# Patient Record
Sex: FEMALE | Race: WHITE | NOT HISPANIC OR LATINO | Employment: OTHER | URBAN - METROPOLITAN AREA
[De-identification: names, ages, dates, MRNs, and addresses within clinical notes are randomized per-mention and may not be internally consistent; named-entity substitution may affect disease eponyms.]

---

## 2017-05-08 ENCOUNTER — TELEPHONE (OUTPATIENT)
Dept: FAMILY MEDICINE | Facility: CLINIC | Age: 67
End: 2017-05-08

## 2017-05-08 DIAGNOSIS — E03.9 ACQUIRED HYPOTHYROIDISM: ICD-10-CM

## 2017-05-08 RX ORDER — LEVOTHYROXINE SODIUM 112 UG/1
112 TABLET ORAL DAILY
Qty: 30 TABLET | Refills: 0 | COMMUNITY
Start: 2017-05-08 | End: 2017-06-13

## 2017-05-08 NOTE — TELEPHONE ENCOUNTER
Spoke with patient, she will call back to make a med refill appointment.   Informed meds were sent to pharmacy and that appt needs to be made within the next 30 days.

## 2017-05-08 NOTE — TELEPHONE ENCOUNTER
Ok refill of Synth[facility]. Pt needs non fasting OV for refills.    Thanks,Blanka  [phone] (home)

## 2017-05-22 ENCOUNTER — OFFICE VISIT (OUTPATIENT)
Dept: FAMILY MEDICINE | Facility: CLINIC | Age: 67
End: 2017-05-22

## 2017-05-22 VITALS
WEIGHT: 178 LBS | TEMPERATURE: 98.2 F | RESPIRATION RATE: 16 BRPM | SYSTOLIC BLOOD PRESSURE: 110 MMHG | OXYGEN SATURATION: 98 % | DIASTOLIC BLOOD PRESSURE: 70 MMHG | HEART RATE: 76 BPM | HEIGHT: 65 IN | BODY MASS INDEX: 29.66 KG/M2

## 2017-05-22 DIAGNOSIS — Z12.31 ENCOUNTER FOR SCREENING MAMMOGRAM FOR BREAST CANCER: Primary | ICD-10-CM

## 2017-05-22 DIAGNOSIS — R05.9 COUGH: ICD-10-CM

## 2017-05-22 PROCEDURE — 99213 OFFICE O/P EST LOW 20 MIN: CPT | Performed by: PHYSICIAN ASSISTANT

## 2017-05-22 NOTE — PROGRESS NOTES
SUBJECTIVE:                                                    Kelly Lara is a 67 year old female who presents to clinic today for the following health issues:    Kelly  is here today because of: Cough    Onset of symptoms was 3 days ago.    The patient has had symptoms of:  Cough      Patient denies ear pain, rhinorrhea, sore throat, headache, nasal congestion.   She does have occ. Eye watering and burning.     Course of illness is same.    Patient denies exposure to illness at home or work/school.     Treatment measures tried include:   Cough drops    Patient is not a smoker    Patient does not have a history of asthma        ROS:  CV: NEGATIVE for chest pain or palpitations   GI: NEGATIVE for nausea, vomiting, abdominal pain, diarrhea or constipation  : negative for dysuria, hematuria    Pt is due for colonoscopy      BP Readings from Last 3 Encounters:   05/22/17 110/70   05/06/16 120/80   02/03/16 120/80    Wt Readings from Last 3 Encounters:   05/22/17 80.7 kg (178 lb)   05/06/16 82.1 kg (181 lb)   02/03/16 82.1 kg (181 lb)                  Patient Active Problem List   Diagnosis     Hypothyroidism     Family history of malignant neoplasm of breast     Hx of colonic polyps     ACP (advance care planning)     Health Care Home     Obesity     Acquired hypothyroidism     Past Surgical History:   Procedure Laterality Date     C LIGATE FALLOPIAN TUBE,POSTPARTUM  1973     HC COLONOSCOPY THRU STOMA W BIOPSY/CAUTERY TUMOR/POLYP/LESION  11/2008    recheck in 5 years     HC REMOVE TONSILS/ADENOIDS,<11 Y/O      age 4     LAPAROSCOPIC APPENDECTOMY  8/14/2012    Procedure: LAPAROSCOPIC APPENDECTOMY;  LAPAROSCOPIC APPENDECTOMY ;  Surgeon: Jane Mak DO;  Location:  OR       Social History   Substance Use Topics     Smoking status: Never Smoker     Smokeless tobacco: Never Used     Alcohol use 3.5 oz/week     7 drink(s) per week     Family History   Problem Relation Age of Onset     CANCER Mother       "breast age 70's     Thyroid Disease Mother      hypothyroid     Thyroid Disease Daughter      Grave's with a pregnancy         Current Outpatient Prescriptions   Medication Sig Dispense Refill     levothyroxine (SYNTHROID) 112 MCG tablet Take 1 tablet (112 mcg) by mouth daily 30 tablet 0     Allergies   Allergen Reactions     No Known Allergies        OBJECTIVE:                                                    /70 (BP Location: Right arm, Cuff Size: Adult Large)  Pulse 76  Temp 98.2  F (36.8  C) (Oral)  Resp 16  Ht 1.657 m (5' 5.25\")  Wt 80.7 kg (178 lb)  LMP 01/01/2000  SpO2 98%  BMI 29.39 kg/m2   Body mass index is 29.39 kg/(m^2).     GENERAL: healthy, alert and no distress  EYES:Lids and Conjunctival normal, no discharge present bilaterally  EARS:   Right: CANAL and TM is normal: no effusions, no erythema, and normal landmarks  Left: CANAL and TM is normal: no effusions, no erythema, and normal landmarks  NOSE: normal  OROPHARYNX: normal, no tonsillar hypertrophy and no exudates present  NECK: normal, supple and no adenopathy  HEART: regular rate and rhythm; no murmurs, clicks, or gallops  LUNGS: CTA bilaterally  SKIN:Warm, Dry and No Rash           ASSESSMENT/PLAN:                                                          P:       ICD-10-CM    1. Encounter for screening mammogram for breast cancer Z12.31 Mammo Screening digital (bilat)     RADIOLOGY REFERRAL   2. Cough R05          Symptomatic cares advised including Increase fluids, rest, acetominophen or ibuprofen prn if not contraindicated.     If applicable advised the following:  Cough: sugar free cough drops, honey, Delsym bid.        The patient is to RTC prn if these symptoms worsen or fail to improve as anticipated.       Jadyn Macdonald PA-C  5/22/2017          "

## 2017-05-22 NOTE — MR AVS SNAPSHOT
After Visit Summary   5/22/2017    Kelly Lara    MRN: [id number]           Patient Information     Date Of Birth          1950        Visit Information        Provider Department      5/22/2017 10:45 AM Jadyn Macdonald PA [facility]        Today's Diagnoses     Encounter for screening mammogram for breast cancer    -  1    Cough           Follow-ups after your visit        Additional Services     RADIOLOGY REFERRAL       Your provider has referred you to: [facility]: [facility] [place] [phone]   [email or web address]    Please be aware that coverage of these services is subject to the terms and limitations of your health insurance plan.  Call member services at your health plan with any benefit or coverage questions.      Please bring the following to your appointment:    >>   Any x-rays, CTs or MRIs which have been performed.  Contact the facility where they were done to arrange for  prior to your scheduled appointment.    >>   List of current medications   >>   This referral request   >>   Any documents/labs given to you for this referral    Prior authorization is required for MRI/MRA, CT, Dexa Scans and Worker's Compensation cases.                  Who to contact     If you have questions or need follow up information about today's clinic visit or your schedule please contact [facility] directly at [phone].  Normal or non-critical lab and imaging results will be communicated to you by MyChart, letter or phone within 4 business days after the clinic has received the results. If you do not hear from us within 7 days, please contact the clinic through MyChart or phone. If you have a critical or abnormal lab result, we will notify you by phone as soon as possible.  Submit refill requests through [employer] or call your pharmacy and they will forward the refill request to us. Please allow 3 business days for your  "refill to be completed.          Additional Information About Your Visit        [employer] Mobile Information     [employer] lets you send messages to your doctor, view your test results, renew your prescriptions, schedule appointments and more. To sign up, go to [email or web address] . Click on \"Log in\" on the left side of the screen, which will take you to the Welcome page. Then click on \"Sign up Now\" on the right side of the page.     You will be asked to enter the access code listed below, as well as some personal information. Please follow the directions to create your username and password.     Your access code is: [email or web address]  Expires: 2017  5:40 AM     Your access code will  in 90 days. If you need help or a new code, please call your [facility] clinic or [phone].        Care EveryWhere ID     This is your Care EveryWhere ID. This could be used by other organizations to access your [facility] medical records  [id number]        Your Vitals Were     Pulse Temperature Respirations Height Last Period Pulse Oximetry    76 98.2  F (36.8  C) (Oral) 16 1.657 m (5' 5.25\") 2000 98%    BMI (Body Mass Index)                   29.39 kg/m2            Blood Pressure from Last 3 Encounters:   17 110/70   16 120/80   16 120/80    Weight from Last 3 Encounters:   17 80.7 kg (178 lb)   16 82.1 kg (181 lb)   16 82.1 kg (181 lb)              We Performed the Following     RADIOLOGY REFERRAL        Primary Care Provider Office Phone # Fax #    Josefina Vidales [phone]       [facility] [address] [address]        Thank you!     Thank you for choosing [facility]  for your care. Our goal is always to provide you with excellent care. Hearing back from our patients is one way we can continue to improve our services. Please take a few minutes to complete the written survey that you may receive in the mail " after your visit with us. Thank you!             Your Updated Medication List - Protect others around you: Learn how to safely use, store and throw away your medicines at www.disposemymeds.org.          This list is accurate as of: 5/22/17 11:59 PM.  Always use your most recent med list.                   Brand Name Dispense Instructions for use    SYNTHROID 112 MCG tablet   Generic drug:  levothyroxine     30 tablet    Take 1 tablet (112 mcg) by mouth daily

## 2017-05-22 NOTE — NURSING NOTE
Patient is here for a cough that started on Thursday.  Pre-Visit Screening :  Immunizations : up to date - will do Prevnar at next med check    Colonoscopy : is due and to be scheduled by patient for later completion  Mammogram : is due and to be scheduled by patient for later completion  Asthma Action Test/Plan : brittny  PHQ9/GAD7 :  Brittny  Pulse - regular  My Chart - declines    CLASSIFICATION OF OVERWEIGHT AND OBESITY BY BMI                         Obesity Class           BMI(kg/m2)  Underweight                                    < 18.5  Normal                                         18.5-24.9  Overweight                                     25.0-29.9  OBESITY                     I                  30.0-34.9                              II                 35.0-39.9  EXTREME OBESITY             III                >40                             Patient's  BMI Body mass index is 29.39 kg/(m^2).  http://hin.nhlbi.nih.gov/menuplanner/menu.cgi  Questioned patient about current smoking habits.  Pt. has never smoked.

## 2017-06-13 DIAGNOSIS — E03.9 ACQUIRED HYPOTHYROIDISM: ICD-10-CM

## 2017-06-13 RX ORDER — LEVOTHYROXINE SODIUM 112 UG/1
112 TABLET ORAL DAILY
Qty: 30 TABLET | Refills: 0 | COMMUNITY
Start: 2017-06-13 | End: 2018-04-19

## 2017-06-13 NOTE — TELEPHONE ENCOUNTER
Pt called about being out of medication and needing a refill.   Pt has a return appointment on 6/20/17.   Called in a 30 day supply for Pt to [facility] in [place].     Called Pt back and informed her of the medication being called in.     DENY Pagan (AAMA)

## 2017-06-16 LAB — MAMMOGRAM: NORMAL

## 2017-06-20 ENCOUNTER — OFFICE VISIT (OUTPATIENT)
Dept: FAMILY MEDICINE | Facility: CLINIC | Age: 67
End: 2017-06-20

## 2017-06-20 VITALS
RESPIRATION RATE: 16 BRPM | DIASTOLIC BLOOD PRESSURE: 74 MMHG | OXYGEN SATURATION: 98 % | BODY MASS INDEX: 29.32 KG/M2 | TEMPERATURE: 98.2 F | WEIGHT: 176 LBS | SYSTOLIC BLOOD PRESSURE: 114 MMHG | HEART RATE: 76 BPM | HEIGHT: 65 IN

## 2017-06-20 DIAGNOSIS — E03.9 ACQUIRED HYPOTHYROIDISM: ICD-10-CM

## 2017-06-20 DIAGNOSIS — Z23 NEED FOR VACCINATION: ICD-10-CM

## 2017-06-20 DIAGNOSIS — Z13.220 SCREENING CHOLESTEROL LEVEL: ICD-10-CM

## 2017-06-20 DIAGNOSIS — Z01.419 ENCOUNTER FOR GYNECOLOGICAL EXAMINATION WITHOUT ABNORMAL FINDING: Primary | ICD-10-CM

## 2017-06-20 DIAGNOSIS — Z13.1 SCREENING FOR DIABETES MELLITUS: ICD-10-CM

## 2017-06-20 LAB
GLUCOSE SERPL-MCNC: 80 MG/DL (ref 60–99)
HEMOGLOBIN: 13.7 G/DL (ref 11.7–15.7)

## 2017-06-20 PROCEDURE — 99397 PER PM REEVAL EST PAT 65+ YR: CPT | Mod: 25 | Performed by: FAMILY MEDICINE

## 2017-06-20 PROCEDURE — 84443 ASSAY THYROID STIM HORMONE: CPT | Mod: 90 | Performed by: FAMILY MEDICINE

## 2017-06-20 PROCEDURE — 80061 LIPID PANEL: CPT | Mod: 90 | Performed by: FAMILY MEDICINE

## 2017-06-20 PROCEDURE — 85018 HEMOGLOBIN: CPT | Performed by: FAMILY MEDICINE

## 2017-06-20 PROCEDURE — 82947 ASSAY GLUCOSE BLOOD QUANT: CPT | Performed by: FAMILY MEDICINE

## 2017-06-20 PROCEDURE — 90670 PCV13 VACCINE IM: CPT | Performed by: FAMILY MEDICINE

## 2017-06-20 PROCEDURE — 36415 COLL VENOUS BLD VENIPUNCTURE: CPT | Performed by: FAMILY MEDICINE

## 2017-06-20 PROCEDURE — G0009 ADMIN PNEUMOCOCCAL VACCINE: HCPCS | Performed by: FAMILY MEDICINE

## 2017-06-20 RX ORDER — LEVOTHYROXINE SODIUM 112 MCG
112 TABLET ORAL DAILY
Qty: 90 TABLET | Refills: 3 | Status: ON HOLD | OUTPATIENT
Start: 2017-06-20 | End: 2022-06-23

## 2017-06-20 NOTE — MR AVS SNAPSHOT
After Visit Summary   6/20/2017    Kelly Lara    MRN: [id number]           Patient Information     Date Of Birth          1950        Visit Information        Provider Department      6/20/2017 11:30 AM Josefina Vidales MD [facility]        Today's Diagnoses     Encounter for gynecological examination without abnormal finding    -  1    Acquired hypothyroidism        Screening for diabetes mellitus        Screening cholesterol level        Need for vaccination          Care Instructions    Total calcium intake of 1500 mgm/day, vitamin D 400-800IU/day and a regular weight bearing exercise program for prevention of osteoporosis is recommended treatment at this time.    A low fat diet, regular aerobic exercise like walking 30 minutes daily and weight control is the treatment recommendations at this time            Follow-ups after your visit        Follow-up notes from your care team     Return in about 1 year (around 6/20/2018).      Who to contact     If you have questions or need follow up information about today's clinic visit or your schedule please contact [facility] directly at [phone].  Normal or non-critical lab and imaging results will be communicated to you by [employer], letter or phone within 4 business days after the clinic has received the results. If you do not hear from us within 7 days, please contact the clinic through [employer] or phone. If you have a critical or abnormal lab result, we will notify you by phone as soon as possible.  Submit refill requests through [employer] or call your pharmacy and they will forward the refill request to us. Please allow 3 business days for your refill to be completed.          Additional Information About Your Visit        MyChart Information     MyFrontSteps lets you send messages to your doctor, view your test results, renew your prescriptions, schedule appointments and more. To sign up, go to  "[email or web address] . Click on \"Log in\" on the left side of the screen, which will take you to the Welcome page. Then click on \"Sign up Now\" on the right side of the page.     You will be asked to enter the access code listed below, as well as some personal information. Please follow the directions to create your username and password.     Your access code is: [email or web address]  Expires: 2017  5:40 AM     Your access code will  in 90 days. If you need help or a new code, please call your [facility] clinic or [phone].        Care EveryWhere ID     This is your Care EveryWhere ID. This could be used by other organizations to access your [facility] medical records  [id number]        Your Vitals Were     Pulse Temperature Respirations Height Last Period Pulse Oximetry    76 98.2  F (36.8  C) (Oral) 16 1.657 m (5' 5.25\") 2000 98%    BMI (Body Mass Index)                   29.06 kg/m2            Blood Pressure from Last 3 Encounters:   17 114/74   17 110/70   16 120/80    Weight from Last 3 Encounters:   17 79.8 kg (176 lb)   17 80.7 kg (178 lb)   16 82.1 kg (181 lb)              We Performed the Following     CL AFF HEMOGLOBIN (BFP)     Glucose Fasting (BFP)     Lipid Profile (QUEST)     PNEUMOCOCCAL CONJ VACCINE 13 VALENT IM     TSH (QUEST)     VACCINE ADMINISTRATION, INITIAL     VENOUS COLLECTION          Today's Medication Changes          These changes are accurate as of: 17 12:54 PM.  If you have any questions, ask your nurse or doctor.               These medicines have changed or have updated prescriptions.        Dose/Directions    * SYNTHROID 112 MCG tablet   This may have changed:  Another medication with the same name was added. Make sure you understand how and when to take each.   Used for:  Acquired hypothyroidism   Generic drug:  levothyroxine   Changed by:  Josefina Vidales MD        Dose:  112 mcg   Take 1 tablet (112 mcg) by mouth daily "   Quantity:  30 tablet   Refills:  0       * SYNTHROID 112 MCG tablet   This may have changed:  You were already taking a medication with the same name, and this prescription was added. Make sure you understand how and when to take each.   Used for:  Acquired hypothyroidism   Generic drug:  levothyroxine   Changed by:  Josefina Vidales MD        Dose:  112 mcg   Take 1 tablet (112 mcg) by mouth daily   Quantity:  90 tablet   Refills:  3       * Notice:  This list has 2 medication(s) that are the same as other medications prescribed for you. Read the directions carefully, and ask your doctor or other care provider to review them with you.         Where to get your medicines      These medications were sent to [facility] #[id number] - [place], [place] - [address] [address]  [address] [address] [facility] [address]    Hours:  [facility] Phone:  [phone]     SYNTHROID 112 MCG tablet                Primary Care Provider Office Phone # Fax #    Josefina Vidales [phone]       [place] [address]  [facility] [address]        Thank you!     Thank you for choosing [facility]  for your care. Our goal is always to provide you with excellent care. Hearing back from our patients is one way we can continue to improve our services. Please take a few minutes to complete the written survey that you may receive in the mail after your visit with us. Thank you!             Your Updated Medication List - Protect others around you: Learn how to safely use, store and throw away your medicines at www.disposemymeds.org.          This list is accurate as of: 6/20/17 12:54 PM.  Always use your most recent med list.                   Brand Name Dispense Instructions for use    * SYNTHROID 112 MCG tablet   Generic drug:  levothyroxine     30 tablet    Take 1 tablet (112 mcg) by mouth daily       * SYNTHROID 112 MCG tablet   Generic drug:  levothyroxine      90 tablet    Take 1 tablet (112 mcg) by mouth daily       * Notice:  This list has 2 medication(s) that are the same as other medications prescribed for you. Read the directions carefully, and ask your doctor or other care provider to review them with you.

## 2017-06-20 NOTE — LETTER
[facility]  [facility] [address]  [place]  [address]    June 21, 2017        Kelly Lara  [address] [address] [address]              Dear Kelly Lara      LAB RESULTS:     The results of your recent Lipid profile were NORMAL.  See the next page for your number results. Your excellent HDL (good) cholesterol and ratio make your risks of heart attack and stroke in the normal range.    If you have any further questions or problems, please contact our office at [phone].          Josefina Vidales MD

## 2017-06-20 NOTE — PROGRESS NOTES
"SUBJECTIVE:  Kelly Lara is an 67 year old  postmenopausal woman   who presents for annual gyn exam. Menopause at age 48. No   bleeding, spotting, or discharge noted.     Estrogen replacement therapy: never  SKYLER exposure: no  History of abnormal Pap smear: No  Family history of uterine or ovarian cancer: No  Regular self breast exam: Yes  History of abnormal mammogram: No  Family history of breast cancer: No  History of abnormal lipids: Yes: working on diet and weight loss    Past Medical History:  No date: Hypothyroid      Family History    CANCER Mother     Comment: breast age 70's    Thyroid Disease Mother     Comment: hypothyroid    Thyroid Disease Daughter     Comment: Grave's with a pregnancy       Past Surgical History:  1973: C LIGATE FALLOPIAN TUBE,POSTPARTUM  2008: HC COLONOSCOPY THRU STOMA W BIOPSY/CAUTERY MIN*     Comment: recheck in 5 years  No date: HC REMOVE TONSILS/ADENOIDS,<13 Y/O     Comment: age 4  2012: LAPAROSCOPIC APPENDECTOMY     Comment: Procedure: LAPAROSCOPIC APPENDECTOMY;                LAPAROSCOPIC APPENDECTOMY ;  Surgeon: Jane Mak DO;  Location:  OR    Current Outpatient Prescriptions:  levothyroxine (SYNTHROID) 112 MCG tablet   No current facility-administered medications for this visit.    -- No Known Allergies        Smoking status: Never Smoker    Smokeless tobacco: Never Used    Alcohol use Yes  3.5 oz/week    7 drink(s) per week            Review Of Systems  Ears/Nose/Throat: negative  Respiratory: No shortness of breath, dyspnea on exertion, cough, or hemoptysis  Cardiovascular: negative  Gastrointestinal: colon polyps  Genitourinary: negative    OBJECTIVE:  /74 (BP Location: Left arm, Cuff Size: Adult Large)  Pulse 76  Temp 98.2  F (36.8  C) (Oral)  Resp 16  Ht 1.657 m (5' 5.25\")  Wt 79.8 kg (176 lb)  LMP 2000  SpO2 98%  BMI 29.06 kg/m2  General appearance: healthy, alert, no distress, cooperative, smiling and over " weight  Skin: Skin color, texture, turgor normal. No rashes or lesions.  Ears: negative  Nose/Sinuses: Nares normal. Septum midline. Mucosa normal. No drainage or sinus tenderness.  Oropharynx: Lips, mucosa, and tongue normal. Teeth and gums normal.  Neck: Neck supple. No adenopathy. Thyroid symmetric, normal size,, Carotids without bruits.  Lungs: negative, Percussion normal. Good diaphragmatic excursion. Lungs clear  Heart: negative, PMI normal. No lifts, heaves, or thrills. RRR. No murmurs, clicks gallops or rub  Breasts: Inspection negative. No nipple discharge or bleeding. No masses.  Abdomen: Abdomen soft, non-tender. BS normal. No masses, organomegaly  Pelvic: Deferred  BMI : Body mass index is 29.06 kg/(m^2).    ASSESSMENT:(Z01.419) Encounter for gynecological examination without abnormal finding  (primary encounter diagnosis)  Plan: CL AFF HEMOGLOBIN (BFP), VENOUS COLLECTION        Total calcium intake of 1500 mgm/day, vitamin D 400-800IU/day and a regular weight bearing exercise program for prevention of osteoporosis is recommended treatment at this time.      (E03.9) Acquired hypothyroidism  Plan: SYNTHROID 112 MCG tablet, TSH (QUEST), VENOUS         COLLECTION        Await labs    (Z13.1) Screening for diabetes mellitus  Plan: Glucose Fasting (BFP), VENOUS COLLECTION            (Z13.220) Screening cholesterol level  Plan: Glucose Fasting (BFP), Lipid Profile (QUEST),         VENOUS COLLECTION            (Z23) Need for vaccination  Plan: PNEUMOCOCCAL CONJ VACCINE 13 VALENT IM, VACCINE        ADMINISTRATION, INITIAL, VENOUS COLLECTION                Dx:  1)  Pap smear, mammogram  2)  Lipids at appropriate intervals    PE:  Reviewed health maintenance including diet, regular exercise,   estrogen replacement and periodic exams.

## 2017-06-20 NOTE — NURSING NOTE
Patient is here for a full physical exam and pap.  Pre-Visit Screening :  Immunizations : up to date    Colonoscopy : is up to date  Mammogram : is up to date  Asthma Action Plan/Test : na  PHQ9/GAD7 : na  Pulse - regular  Medication Reconciliation: complete    BP done on the right arm, with a lg sized cuff.  Pulse - regular  My Chart - declines    CLASSIFICATION OF OVERWEIGHT AND OBESITY BY BMI                         Obesity Class           BMI(kg/m2)  Underweight                                    < 18.5  Normal                                         18.5-24.9  Overweight                                     25.0-29.9  OBESITY                     I                  30.0-34.9                              II                 35.0-39.9  EXTREME OBESITY             III                >40                             Patient's  BMI Body mass index is 29.06 kg/(m^2).  http://hin.nhlbi.nih.gov/menuplanner/menu.cgi  Questioned patient about current smoking habits.  Pt. has never smoked.  ETOH screening:  Questions:  1-How often do you have a drink containing alcohol?                             2 times per week(s)  2-How many drinks containing alcohol do you have on a typical day when you are         Drinking?                              1   3- How often do you have 5 or more drinks on one occasion?                              never per never    Have you ever:  None of the patient's responses to the CAGE screening were positive / Negative CAGE score

## 2017-06-21 LAB
CHOLEST SERPL-MCNC: 220 MG/DL (ref 125–200)
CHOLEST/HDLC SERPL: 3 (CALC)
HDLC SERPL-MCNC: 74 MG/DL
LDLC SERPL CALC-MCNC: 127 MG/DL (CALC)
NONHDLC SERPL-MCNC: 146 MG/DL (CALC)
TRIGL SERPL-MCNC: 93 MG/DL
TSH SERPL-ACNC: 2.05 MIU/L (ref 0.4–4.5)

## 2017-11-28 ENCOUNTER — OFFICE VISIT (OUTPATIENT)
Dept: FAMILY MEDICINE | Facility: CLINIC | Age: 67
End: 2017-11-28

## 2017-11-28 VITALS
DIASTOLIC BLOOD PRESSURE: 80 MMHG | RESPIRATION RATE: 12 BRPM | WEIGHT: 168.6 LBS | HEART RATE: 68 BPM | SYSTOLIC BLOOD PRESSURE: 122 MMHG | OXYGEN SATURATION: 97 % | TEMPERATURE: 97.5 F | BODY MASS INDEX: 27.1 KG/M2 | HEIGHT: 66 IN

## 2017-11-28 DIAGNOSIS — K29.00 OTHER ACUTE GASTRITIS WITHOUT HEMORRHAGE: Primary | ICD-10-CM

## 2017-11-28 DIAGNOSIS — M94.0 COSTOCHONDRITIS: ICD-10-CM

## 2017-11-28 LAB
% GRANULOCYTES: 72.7 %
HCT VFR BLD AUTO: 38.8 % (ref 35–47)
HELIOBACTER PYLORI ANTIBODY SCREEN: NORMAL
HEMOGLOBIN: 12.6 G/DL (ref 11.7–15.7)
LYMPHOCYTES NFR BLD AUTO: 16.1 %
MCH RBC QN AUTO: 31.5 PG (ref 26–33)
MCHC RBC AUTO-ENTMCNC: 32.5 G/DL (ref 31–36)
MCV RBC AUTO: 96.9 FL (ref 78–100)
MONOCYTES NFR BLD AUTO: 11.2 %
PLATELET COUNT - QUEST: 404 10^9/L (ref 150–375)
RBC # BLD AUTO: 4 10*12/L (ref 3.8–5.2)
WBC # BLD AUTO: 7.5 10*9/L (ref 4–11)

## 2017-11-28 PROCEDURE — 85025 COMPLETE CBC W/AUTO DIFF WBC: CPT | Performed by: FAMILY MEDICINE

## 2017-11-28 PROCEDURE — 36415 COLL VENOUS BLD VENIPUNCTURE: CPT | Performed by: FAMILY MEDICINE

## 2017-11-28 PROCEDURE — 99214 OFFICE O/P EST MOD 30 MIN: CPT | Performed by: FAMILY MEDICINE

## 2017-11-28 PROCEDURE — 86677 HELICOBACTER PYLORI ANTIBODY: CPT | Performed by: FAMILY MEDICINE

## 2017-11-28 RX ORDER — OMEPRAZOLE 10 MG/1
CAPSULE, DELAYED RELEASE ORAL
Qty: 20 CAPSULE | Refills: 0 | Status: SHIPPED | OUTPATIENT
Start: 2017-11-28 | End: 2017-12-21

## 2017-11-28 NOTE — PATIENT INSTRUCTIONS
Other acute gastritis without hemorrhage  (primary encounter diagnosis)  Comment: handout reviewed  Plan: CL AFF HEMOGRAM/PLATE/DIFF (BFP), VENOUS         COLLECTION, CL AFF HELICOBACTER PYLORI (BFP),         omeprazole (PRILOSEC) 10 MG CR capsule        Gentle diet. Potential medication side effects were discussed with the patient; let me know if any occur.. Monitor response    (M94.0) Costochondritis  Comment: handout given  Plan: CL AFF HEMOGRAM/PLATE/DIFF (BFP), VENOUS         COLLECTION, CL AFF HELICOBACTER PYLORI (BFP)        Monitor response.

## 2017-11-28 NOTE — NURSING NOTE
Kelly states on 11-8 she ate at [employer] and that night the pt had severe cramping and stated that ever since that night she has had a upset stomach, no appetite, and has been very drowsy.     Pre-Visit Screening :  Immunizations : up to date    Colonoscopy : is up to date  Mammogram : is up to date  Asthma Action Test/Plan : NA  PHQ9/GAD7 :  NA    Pulse - regular  My Chart - declines    CLASSIFICATION OF OVERWEIGHT AND OBESITY BY BMI                         Obesity Class           BMI(kg/m2)  Underweight                                    < 18.5  Normal                                         18.5-24.9  Overweight                                     25.0-29.9  OBESITY                     I                  30.0-34.9                              II                 35.0-39.9  EXTREME OBESITY             III                >40                             Patient's  BMI Body mass index is 27.63 kg/(m^2).  http://hin.nhlbi.nih.gov/menuplanner/menu.cgi  Questioned patient about current smoking habits.  Pt. has never smoked.  The patient has verbalized that it is ok to leave a detailed voice message on the patient's cell phone with results/recommendations from this visit.

## 2017-11-28 NOTE — MR AVS SNAPSHOT
After Visit Summary   11/28/2017    Kelly Lara    MRN: [id number]           Patient Information     Date Of Birth          1950        Visit Information        Provider Department      11/28/2017 2:00 PM Josefina Vidales MD [facility]        Today's Diagnoses     Other acute gastritis without hemorrhage    -  1    Costochondritis          Care Instructions    Other acute gastritis without hemorrhage  (primary encounter diagnosis)  Comment: handout reviewed  Plan: CL AFF HEMOGRAM/PLATE/DIFF (BFP), VENOUS         COLLECTION, CL AFF HELICOBACTER PYLORI (BFP),         omeprazole (PRILOSEC) 10 MG CR capsule        Gentle diet. Potential medication side effects were discussed with the patient; let me know if any occur.. Monitor response    (M94.0) Costochondritis  Comment: handout given  Plan: CL AFF HEMOGRAM/PLATE/DIFF (BFP), VENOUS         COLLECTION, CL AFF HELICOBACTER PYLORI (BFP)        Monitor response.            Follow-ups after your visit        Who to contact     If you have questions or need follow up information about today's clinic visit or your schedule please contact [facility] directly at [phone].  Normal or non-critical lab and imaging results will be communicated to you by MyChart, letter or phone within 4 business days after the clinic has received the results. If you do not hear from us within 7 days, please contact the clinic through MyChart or phone. If you have a critical or abnormal lab result, we will notify you by phone as soon as possible.  Submit refill requests through [employer] or call your pharmacy and they will forward the refill request to us. Please allow 3 business days for your refill to be completed.          Additional Information About Your Visit        Care EveryWhere ID     This is your Care EveryWhere ID. This could be used by other organizations to access your [facility] medical records  [id number]       "  Your Vitals Were     Pulse Temperature Respirations Height Last Period Pulse Oximetry    68 97.5  F (36.4  C) (Oral) 12 1.664 m (5' 5.5\") 01/01/2000 97%    Breastfeeding? BMI (Body Mass Index)                No 27.63 kg/m2           Blood Pressure from Last 3 Encounters:   11/28/17 122/80   06/20/17 114/74   05/22/17 110/70    Weight from Last 3 Encounters:   11/28/17 76.5 kg (168 lb 9.6 oz)   06/20/17 79.8 kg (176 lb)   05/22/17 80.7 kg (178 lb)              We Performed the Following     CL AFF HELICOBACTER PYLORI (BFP)     CL AFF HEMOGRAM/PLATE/DIFF (BFP)     VENOUS COLLECTION          Today's Medication Changes          These changes are accurate as of: 11/28/17  3:20 PM.  If you have any questions, ask your nurse or doctor.               Start taking these medicines.        Dose/Directions    omeprazole 10 MG CR capsule   Commonly known as:  priLOSEC   Used for:  Other acute gastritis without hemorrhage   Started by:  Josefina Vidales MD        Take by mouth 30-60 minutes before a meal.   Quantity:  20 capsule   Refills:  0            Where to get your medicines      These medications were sent to [facility]/pharmacy #[id number] - [place], [place] - [address] [facility]  [address] [address] [facility] [address]    Hours:  [facility] to [employer] Phone:  [phone]     omeprazole 10 MG CR capsule                Primary Care Provider Office Phone # Fax #    Josefina Vidales [phone]       [facility] [address] [facility]  [address]  [facility] [address]        Equal Access to Services     [facility] Heart[facility]AND[facility] AH: Hadii nurys feliciano hadasho Soomaali, waaxda luqadaha, qaybta kaalmada adeegyawanda, eugenia doyle. So [facility] [phone].    ATENCIÓN: Si habla español, tiene a hernandez disposición servicios gratuitos de asistencia lingüística. Llame al [phone].    We comply with applicable federal civil rights laws and [place] laws. We do not discriminate on the basis of race, color, national origin, age, " disability, sex, sexual orientation, or gender identity.            Thank you!     Thank you for choosing [facility]  for your care. Our goal is always to provide you with excellent care. Hearing back from our patients is one way we can continue to improve our services. Please take a few minutes to complete the written survey that you may receive in the mail after your visit with us. Thank you!             Your Updated Medication List - Protect others around you: Learn how to safely use, store and throw away your medicines at www.disposemymeds.org.          This list is accurate as of: 11/28/17  3:20 PM.  Always use your most recent med list.                   Brand Name Dispense Instructions for use Diagnosis    omeprazole 10 MG CR capsule    priLOSEC    20 capsule    Take by mouth 30-60 minutes before a meal.    Other acute gastritis without hemorrhage       * SYNTHROID 112 MCG tablet   Generic drug:  levothyroxine     30 tablet    Take 1 tablet (112 mcg) by mouth daily    Acquired hypothyroidism       * SYNTHROID 112 MCG tablet   Generic drug:  levothyroxine     90 tablet    Take 1 tablet (112 mcg) by mouth daily    Acquired hypothyroidism       * Notice:  This list has 2 medication(s) that are the same as other medications prescribed for you. Read the directions carefully, and ask your doctor or other care provider to review them with you.

## 2017-11-28 NOTE — PROGRESS NOTES
SUBJECTIVE: 67 year old female complaining of stomach upset after eating at [employer] starting a few hours and including 24 hours of diarrhea slowly resolving. She continue for 4 week(s) to be tired and have no appetite.   The patient describes some chest discomfort in the morning getting out of bed.   The patient denies a history of fever, SOB, GI or  complaints.   Smoking history: No.   Relevant past medical history: positive for has been lifting weights with exercise routine more than usual.     ROS: 10 point ROS neg other than the symptoms noted above in the HPI.  Past Surgical History:   Procedure Laterality Date     C LIGATE FALLOPIAN TUBE,POSTPARTUM  1973     HC COLONOSCOPY THRU STOMA W BIOPSY/CAUTERY TUMOR/POLYP/LESION  11/2008    recheck in 5 years     HC REMOVE TONSILS/ADENOIDS,<13 Y/O      age 4     LAPAROSCOPIC APPENDECTOMY  8/14/2012    Procedure: LAPAROSCOPIC APPENDECTOMY;  LAPAROSCOPIC APPENDECTOMY ;  Surgeon: Jane Mak DO;  Location:  OR     Current Outpatient Prescriptions   Medication     omeprazole (PRILOSEC) 10 MG CR capsule     SYNTHROID 112 MCG tablet     levothyroxine (SYNTHROID) 112 MCG tablet     No current facility-administered medications for this visit.          OBJECTIVE: The patient appears healthy, alert, no distress, cooperative and smiling.   EARS: negative  NOSE/SINUS: Nares normal. Septum midline. Mucosa normal. No drainage or sinus tenderness.   THROAT: normal   NECK:Neck supple. No adenopathy. Thyroid symmetric, normal size,, Carotids without bruits.   CHEST: Regular rate and  rhythm. S1 and S2 normal, no murmurs, clicks, gallops or rubs. No edema or JVD. Chest is clear; no wheezes or rales.  Pain along the costochondral areas bilaterally on palpation. No skin changes  ABD: The abdomen is soft without guarding, mass or organomegaly. Mild epigastric tenderness on deep palpation. Bowel sounds are normal. No CVA tenderness or inguinal adenopathy noted.    CBC: WNL  H  pylori: neg    ASSESSMENT: (K29.00) Other acute gastritis without hemorrhage  (primary encounter diagnosis)  Comment: handout reviewed  Plan: CL AFF HEMOGRAM/PLATE/DIFF (BFP), VENOUS         COLLECTION, CL AFF HELICOBACTER PYLORI (BFP),         omeprazole (PRILOSEC) 10 MG CR capsule        Gentle diet. Potential medication side effects were discussed with the patient; let me know if any occur.. Monitor response    (M94.0) Costochondritis  Comment: handout given  Plan: CL AFF HEMOGRAM/PLATE/DIFF (BFP), VENOUS         COLLECTION, CL AFF HELICOBACTER PYLORI (BFP)        Monitor response.

## 2017-12-21 ENCOUNTER — APPOINTMENT (OUTPATIENT)
Dept: CT IMAGING | Facility: CLINIC | Age: 67
End: 2017-12-21
Attending: EMERGENCY MEDICINE
Payer: MEDICARE

## 2017-12-21 ENCOUNTER — HOSPITAL ENCOUNTER (EMERGENCY)
Facility: CLINIC | Age: 67
Discharge: HOME | End: 2017-12-21
Attending: EMERGENCY MEDICINE | Admitting: EMERGENCY MEDICINE
Payer: MEDICARE

## 2017-12-21 VITALS
TEMPERATURE: 97.5 F | BODY MASS INDEX: 25.76 KG/M2 | SYSTOLIC BLOOD PRESSURE: 130 MMHG | WEIGHT: 160.3 LBS | OXYGEN SATURATION: 97 % | HEIGHT: 66 IN | RESPIRATION RATE: 16 BRPM | DIASTOLIC BLOOD PRESSURE: 86 MMHG

## 2017-12-21 DIAGNOSIS — I26.99 OTHER PULMONARY EMBOLISM WITHOUT ACUTE COR PULMONALE, UNSPECIFIED CHRONICITY (H): ICD-10-CM

## 2017-12-21 DIAGNOSIS — C78.6 PERITONEAL CARCINOMATOSIS (H): ICD-10-CM

## 2017-12-21 LAB
ALBUMIN SERPL-MCNC: 2.9 G/DL (ref 3.4–5)
ALP SERPL-CCNC: 79 U/L (ref 40–150)
ALT SERPL W P-5'-P-CCNC: 79 U/L (ref 0–50)
ANION GAP SERPL CALCULATED.3IONS-SCNC: 8 MMOL/L (ref 3–14)
APTT PPP: 30 SEC (ref 22–37)
AST SERPL W P-5'-P-CCNC: 63 U/L (ref 0–45)
BASOPHILS # BLD AUTO: 0.1 10E9/L (ref 0–0.2)
BASOPHILS NFR BLD AUTO: 0.5 %
BILIRUB SERPL-MCNC: 0.4 MG/DL (ref 0.2–1.3)
BUN SERPL-MCNC: 20 MG/DL (ref 7–30)
CALCIUM SERPL-MCNC: 8.5 MG/DL (ref 8.5–10.1)
CHLORIDE SERPL-SCNC: 100 MMOL/L (ref 94–109)
CO2 SERPL-SCNC: 27 MMOL/L (ref 20–32)
CREAT SERPL-MCNC: 0.72 MG/DL (ref 0.52–1.04)
DIFFERENTIAL METHOD BLD: NORMAL
EOSINOPHIL # BLD AUTO: 0.2 10E9/L (ref 0–0.7)
EOSINOPHIL NFR BLD AUTO: 1.6 %
ERYTHROCYTE [DISTWIDTH] IN BLOOD BY AUTOMATED COUNT: 12.6 % (ref 10–15)
GFR SERPL CREATININE-BSD FRML MDRD: 80 ML/MIN/1.7M2
GLUCOSE SERPL-MCNC: 108 MG/DL (ref 70–99)
HCT VFR BLD AUTO: 36.7 % (ref 35–47)
HGB BLD-MCNC: 12 G/DL (ref 11.7–15.7)
IMM GRANULOCYTES # BLD: 0 10E9/L (ref 0–0.4)
IMM GRANULOCYTES NFR BLD: 0.2 %
INR PPP: 1.09 (ref 0.86–1.14)
LYMPHOCYTES # BLD AUTO: 0.8 10E9/L (ref 0.8–5.3)
LYMPHOCYTES NFR BLD AUTO: 8 %
MCH RBC QN AUTO: 29.6 PG (ref 26.5–33)
MCHC RBC AUTO-ENTMCNC: 32.7 G/DL (ref 31.5–36.5)
MCV RBC AUTO: 90 FL (ref 78–100)
MONOCYTES # BLD AUTO: 0.8 10E9/L (ref 0–1.3)
MONOCYTES NFR BLD AUTO: 8 %
NEUTROPHILS # BLD AUTO: 7.7 10E9/L (ref 1.6–8.3)
NEUTROPHILS NFR BLD AUTO: 81.7 %
NRBC # BLD AUTO: 0 10*3/UL
NRBC BLD AUTO-RTO: 0 /100
NT-PROBNP SERPL-MCNC: 212 PG/ML (ref 0–900)
PLATELET # BLD AUTO: 368 10E9/L (ref 150–450)
POTASSIUM SERPL-SCNC: 3.8 MMOL/L (ref 3.4–5.3)
PROT SERPL-MCNC: 7.6 G/DL (ref 6.8–8.8)
RBC # BLD AUTO: 4.06 10E12/L (ref 3.8–5.2)
SODIUM SERPL-SCNC: 135 MMOL/L (ref 133–144)
TROPONIN I SERPL-MCNC: <0.015 UG/L (ref 0–0.04)
WBC # BLD AUTO: 9.5 10E9/L (ref 4–11)

## 2017-12-21 PROCEDURE — 85610 PROTHROMBIN TIME: CPT | Performed by: EMERGENCY MEDICINE

## 2017-12-21 PROCEDURE — 83880 ASSAY OF NATRIURETIC PEPTIDE: CPT | Performed by: EMERGENCY MEDICINE

## 2017-12-21 PROCEDURE — 85025 COMPLETE CBC W/AUTO DIFF WBC: CPT | Performed by: EMERGENCY MEDICINE

## 2017-12-21 PROCEDURE — 96372 THER/PROPH/DIAG INJ SC/IM: CPT

## 2017-12-21 PROCEDURE — 99285 EMERGENCY DEPT VISIT HI MDM: CPT | Mod: 25

## 2017-12-21 PROCEDURE — 70450 CT HEAD/BRAIN W/O DYE: CPT

## 2017-12-21 PROCEDURE — 85730 THROMBOPLASTIN TIME PARTIAL: CPT | Performed by: EMERGENCY MEDICINE

## 2017-12-21 PROCEDURE — 84484 ASSAY OF TROPONIN QUANT: CPT | Performed by: EMERGENCY MEDICINE

## 2017-12-21 PROCEDURE — 25000128 H RX IP 250 OP 636: Performed by: EMERGENCY MEDICINE

## 2017-12-21 PROCEDURE — 93005 ELECTROCARDIOGRAM TRACING: CPT

## 2017-12-21 PROCEDURE — 80053 COMPREHEN METABOLIC PANEL: CPT | Performed by: EMERGENCY MEDICINE

## 2017-12-21 RX ADMIN — ENOXAPARIN SODIUM 70 MG: 80 INJECTION SUBCUTANEOUS at 17:48

## 2017-12-21 ASSESSMENT — ENCOUNTER SYMPTOMS
FEVER: 0
VOMITING: 0
SHORTNESS OF BREATH: 0
HEADACHES: 0
COUGH: 1
NAUSEA: 0

## 2017-12-21 NOTE — DISCHARGE INSTRUCTIONS
Please make an appointment to follow up with [facility] [phone] as soon as possible even if entirely better.    Please make an appointment to follow up with your primary care provider in 4 days even if entirely better.

## 2017-12-21 NOTE — ED NOTES
Patient arrives here for evaluation of CT results that was done today. Had a chest xray in November and found fluid, was drained and found to be cancerous. She had CT today and was told to come into the ER. Patient notes mild cough but denies SOB or hemotypsis. AOX4 ABCs intact.

## 2017-12-21 NOTE — ED AVS SNAPSHOT
[facility]    [address]    [facility] [address]    Phone:  [phone]    Fax:  [phone]                                       Kelly Lara   MRN: [id number]    Department:  [facility]   Date of Visit:  12/21/2017           Patient Information     Date Of Birth          1950        Your diagnoses for this visit were:     Other pulmonary embolism without acute cor pulmonale, unspecified chronicity (H)     Peritoneal carcinomatosis (H)        You were seen by Charli Main MD.        Discharge Instructions       Please make an appointment to follow up with [facility] [phone] as soon as possible even if entirely better.    Please make an appointment to follow up with your primary care provider in 4 days even if entirely better.            Discharge References/Attachments     EMBOLISM, PULMONARY (ENGLISH)      24 Hour Appointment Hotline       To make an appointment at any [facility] clinic, call [phone] ([phone]). If you don't have a family doctor or clinic, we will help you find one. [facility] clinics are conveniently located to serve the needs of you and your family.             Review of your medicines      START taking        Dose / Directions Last dose taken    enoxaparin 80 MG/0.8ML injection   Commonly known as:  LOVENOX   Dose:  1 mg/kg   Quantity:  20.44 mL        Inject 0.73 mLs (73 mg) Subcutaneous 2 times daily for 14 days   Refills:  0          Our records show that you are taking the medicines listed below. If these are incorrect, please call your family doctor or clinic.        Dose / Directions Last dose taken    * SYNTHROID 112 MCG tablet   Dose:  112 mcg   Quantity:  30 tablet   Generic drug:  levothyroxine        Take 1 tablet (112 mcg) by mouth daily   Refills:  0        * SYNTHROID 112 MCG tablet   Dose:  112 mcg   Quantity:  90 tablet   Generic drug:   levothyroxine        Take 1 tablet (112 mcg) by mouth daily   Refills:  3        * Notice:  This list has 2 medication(s) that are the same as other medications prescribed for you. Read the directions carefully, and ask your doctor or other care provider to review them with you.            Prescriptions were sent or printed at these locations (1 Prescription)                   Other Prescriptions                Printed at Department/Unit printer (1 of 1)         enoxaparin (LOVENOX) 80 MG/0.8ML injection                Procedures and tests performed during your visit     BNP    CBC + differential    Comprehensive metabolic panel    EKG 12 lead    Head CT w/o contrast    INR    PTT    Troponin I      Orders Needing Specimen Collection     None      Pending Results     Date and Time Order Name Status Description    12/21/2017 1506 Head CT w/o contrast Preliminary             Pending Culture Results     No orders found from 12/19/2017 to 12/22/2017.            Pending Results Instructions     If you had any lab results that were not finalized at the time of your Discharge, you can call the ED Lab Result RN at [phone]. You will be contacted by this team for any positive Lab results or changes in treatment. The nurses are available 7 days a week from 10A to 6:30P.  You can leave a message 24 hours per day and they will return your call.        Test Results From Your Hospital Stay        12/21/2017  3:21 PM      Component Results     Component Value Ref Range & Units Status    WBC 9.5 4.0 - 11.0 10e9/L Final    RBC Count 4.06 3.8 - 5.2 10e12/L Final    Hemoglobin 12.0 11.7 - 15.7 g/dL Final    Hematocrit 36.7 35.0 - 47.0 % Final    MCV 90 78 - 100 fl Final    MCH 29.6 26.5 - 33.0 pg Final    MCHC 32.7 31.5 - 36.5 g/dL Final    RDW 12.6 10.0 - 15.0 % Final    Platelet Count 368 150 - 450 10e9/L Final    Diff Method Automated Method  Final    % Neutrophils 81.7 % Final    % Lymphocytes 8.0 % Final    % Monocytes 8.0 %  Final    % Eosinophils 1.6 % Final    % Basophils 0.5 % Final    % Immature Granulocytes 0.2 % Final    Nucleated RBCs 0 0 /100 Final    Absolute Neutrophil 7.7 1.6 - 8.3 10e9/L Final    Absolute Lymphocytes 0.8 0.8 - 5.3 10e9/L Final    Absolute Monocytes 0.8 0.0 - 1.3 10e9/L Final    Absolute Eosinophils 0.2 0.0 - 0.7 10e9/L Final    Absolute Basophils 0.1 0.0 - 0.2 10e9/L Final    Abs Immature Granulocytes 0.0 0 - 0.4 10e9/L Final    Absolute Nucleated RBC 0.0  Final         12/21/2017  3:44 PM      Component Results     Component Value Ref Range & Units Status    Sodium 135 133 - 144 mmol/L Final    Potassium 3.8 3.4 - 5.3 mmol/L Final    Chloride 100 94 - 109 mmol/L Final    Carbon Dioxide 27 20 - 32 mmol/L Final    Anion Gap 8 3 - 14 mmol/L Final    Glucose 108 (H) 70 - 99 mg/dL Final    Urea Nitrogen 20 7 - 30 mg/dL Final    Creatinine 0.72 0.52 - 1.04 mg/dL Final    GFR Estimate 80 >60 mL/min/1.7m2 Final    Non  GFR Calc    GFR Estimate If Black >90 >60 mL/min/1.7m2 Final    African American GFR Calc    Calcium 8.5 8.5 - 10.1 mg/dL Final    Bilirubin Total 0.4 0.2 - 1.3 mg/dL Final    Albumin 2.9 (L) 3.4 - 5.0 g/dL Final    Protein Total 7.6 6.8 - 8.8 g/dL Final    Alkaline Phosphatase 79 40 - 150 U/L Final    ALT 79 (H) 0 - 50 U/L Final    AST 63 (H) 0 - 45 U/L Final         12/21/2017  3:31 PM      Component Results     Component Value Ref Range & Units Status    INR 1.09 0.86 - 1.14 Final         12/21/2017  3:31 PM      Component Results     Component Value Ref Range & Units Status    PTT 30 22 - 37 sec Final         12/21/2017  3:41 PM      Component Results     Component Value Ref Range & Units Status    N-Terminal Pro BNP Inpatient 212 0 - 900 pg/mL Final       Reference range shown and results flagged as abnormal are suggested inpatient   cut points for confirming diagnosis if CHF in an acute setting. Establishing a   baseline value for each individual patient is useful for follow-up.  An   inpatient or emergency department NT-proPBNP <300 pg/mL effectively rules out   acute CHF, with 99% negative predictive value.  The outpatient non-acute reference range for ruling out CHF is:   0-125 pg/mL (age 18 to less than 75)   0-450 pg/mL (age 75 yrs and older)           12/21/2017  3:41 PM      Component Results     Component Value Ref Range & Units Status    Troponin I ES <0.015 0.000 - 0.045 ug/L Final    The 99th percentile for upper reference range is 0.045 ug/L.  Troponin values   in the range of 0.045 - 0.120 ug/L may be associated with risks of adverse   clinical events.           12/21/2017  4:37 PM      Narrative     CT HEAD WITHOUT CONTRAST  12/21/2017 4:27 PM    HISTORY: Starting Lovenox for PE. Rule out intracranial metastases.    TECHNIQUE: Scans were obtained through the head without IV contrast.   Radiation dose for this scan was reduced using automated exposure  control, adjustment of the mA and/or kV according to patient size, or  iterative reconstruction technique.    COMPARISON: None.    FINDINGS: No hemorrhage, mass lesion, or focal area of acute  infarction identified. Paranasal sinuses are normal. No bony  abnormality.        Impression     IMPRESSION: Negative CT scan of the head.                Clinical Quality Measure: Blood Pressure Screening     Your blood pressure was checked while you were in the emergency department today. The last reading we obtained was  BP: 130/86 . Please read the guidelines below about what these numbers mean and what you should do about them.  If your systolic blood pressure (the top number) is less than 120 and your diastolic blood pressure (the bottom number) is less than 80, then your blood pressure is normal. There is nothing more that you need to do about it.  If your systolic blood pressure (the top number) is 120-139 or your diastolic blood pressure (the bottom number) is 80-89, your blood pressure may be higher than it should be. You should  have your blood pressure rechecked within a year by a primary care provider.  If your systolic blood pressure (the top number) is 140 or greater or your diastolic blood pressure (the bottom number) is 90 or greater, you may have high blood pressure. High blood pressure is treatable, but if left untreated over time it can put you at risk for heart attack, stroke, or kidney failure. You should have your blood pressure rechecked by a primary care provider within the next 4 weeks.  If your provider in the emergency department today gave you specific instructions to follow-up with your doctor or provider even sooner than that, you should follow that instruction and not wait for up to 4 weeks for your follow-up visit.        Thank you for choosing [facility]       Thank you for choosing [facility] for your care. Our goal is always to provide you with excellent care. Hearing back from our patients is one way we can continue to improve our services. Please take a few minutes to complete the written survey that you may receive in the mail after you visit with us. Thank you!        Care EveryWhere ID     This is your Care EveryWhere ID. This could be used by other organizations to access your [facility] medical records  [id number]        Equal Access to Services     FANY GALAVIZ : Hadtali Khan, monica vale, eugenia pool. So [facility] [phone].    ATENCIÓN: Si habla español, tiene a hernandez disposición servicios gratuitos de asistencia lingüística. Felipe al [phone].    We comply with applicable federal civil rights laws and [place] laws. We do not discriminate on the basis of race, color, national origin, age, disability, sex, sexual orientation, or gender identity.            After Visit Summary       This is your record. Keep this with you and show to your community pharmacist(s) and doctor(s) at your next visit.

## 2017-12-21 NOTE — ED AVS SNAPSHOT
[facility]    [address]    [facility] [address]    Phone:  [phone]    Fax:  [phone]                                       Kelly Lara   MRN: [id number]    Department:  [facility]   Date of Visit:  12/21/2017           After Visit Summary Signature Page     I have received my discharge instructions, and my questions have been answered. I have discussed any challenges I see with this plan with the nurse or doctor.    ..........................................................................................................................................  Patient/Patient Representative Signature      ..........................................................................................................................................  Patient Representative Print Name and Relationship to Patient    ..................................................               ................................................  Date                                            Time    ..........................................................................................................................................  Reviewed by Signature/Title    ...................................................              ..............................................  Date                                                            Time

## 2017-12-21 NOTE — ED PROVIDER NOTES
History     Chief Complaint:  Cough    HPI   Kelly Lara is a 67 year old female who presents for evaluation of a cough. On 12/18/2017, the patient was seen in her [facility] clinic for a several week history of a cough with shortness of breath. At that time, she had a chest X-ray showing a large left pleural effusion. On 12/19, she had a US thoracentesis performed with findings concerning for malignant pleural effusion. Following this, the patient had improvement of her shortness of breath. Today, the patient had a CT scan of her chest, abdomen, and pelvis to evaluate for potential cancer. This study had findings concerning for multiple pulmonary emboli and peritoneal carcinomatosis, and she was referred to the ED for further evaluation and management. Details regarding this study are as below. Currently in the ED, the patient reports that she is actually feeling well, and she denies any recent fever, chest pain, ongoing shortness of breath, hemoptysis, nausea, vomiting, abnormal leg swelling, headache, or visual disturbance. She has no personal history of blood clots and has not traveled or had surgery recently.     CT Chest/Abdomen/Pelvis w IV Contrast 12/21/2017:  IMPRESSION:    1. Filling defects in the segmental and subsegmental pulmonary arteries in the right lower lobe are most consistent with pulmonary emboli, question right upper lobe pulmonary emboli. Consider dedicated CT chest PE exam for more accurate evaluation of the extent of pulmonary embolism.  2. Small to moderate amount of ascites throughout the abdomen and pelvis with peritoneal soft tissue nodularity and omental soft tissue nodularity, most consistent with peritoneal carcinomatosis. A definite primary site of malignancy is not identified on this exam (no definite adnexal/ovarian mass, no definite pancreatic or other GI mass).  3. Decrease in the left pleural effusion, consistent with interval thoracentesis. Left lower lobe airspace  "consolidation is favored to represent an area of atelectasis. No definite lung mass.     Allergies:  NKDA      Medications:    Synthroid     Past Medical History:    Hypothyroid     Past Surgical History:    Ligate fallopian tube, postpartum  Colonoscopy   Tonsillectomy and adenoidectomy   Laparoscopic appendectomy     Family History:    Cancer, breast - Mother  Hypothyroid - Mother  Grave's disease - Daughter     Social History:  Tobacco use:    Never smoker  Alcohol use:    Positive - 7 drinks / week  Marital status:       Accompanied to ED by:  Alone      Review of Systems   Constitutional: Negative for fever.   Eyes: Negative for visual disturbance.   Respiratory: Positive for cough. Negative for shortness of breath.         (-) Hemoptysis    Cardiovascular: Negative for chest pain and leg swelling.   Gastrointestinal: Negative for nausea and vomiting.   Neurological: Negative for headaches.   All other systems reviewed and are negative.    Physical Exam   First Vitals:  BP: (!) 161/95  Heart Rate: 88  Temp: 97.5  F (36.4  C)  Resp: 16  Height: 167.6 cm (5' 6\")  Weight: 72.7 kg (160 lb 4.8 oz)  SpO2: 96 %      Physical Exam    General:   Pleasant, age appropriate well appearing female.  HEENT:    Oropharynx is moist, without lesions or trismus.  Eyes:    Conjunctiva normal  Neck:    Supple, no meningismus.     CV:     Regular rate and rhythm.      No murmurs, rubs or gallops.       No JVD or unilateral leg swelling.       2+ radial pulses bilateral.       No  lower extremity edema.  PULM:    Clear to auscultation bilateral.       No respiratory distress.      Good air exchange.     No rales or wheezing.     No stridor.  ABD:    Soft, non-tender, non-distended.       No pulsatile masses.       No rebound, guarding or rigidity.  MSK:     No gross deformity to all four extremities.   LYMPH:   No cervical lymphadenopathy.  NEURO:   Alert. Good muscle tone, no atrophy.  Skin:    Warm, dry and intact.    Psych: "    Mood is good and affect is appropriate.        Emergency Department Course   ECG (15:26:01):  Indication: Screening for cardiovascular disease.   Rate 77 bpm. CO interval 128 ms. QRS duration 92 ms. QT/QTc 386/436 ms. P-R-T axes 15 21 7.   Interpretation: Normal sinus rhythm, Normal ECG  Agree with computer interpretation. Yes  No significant change compared to EKG dated 2/3/16.   Interpreted at 1531 by Dr. Main.      Imaging:  Radiographic findings were communicated with the patient who voiced understanding of the findings.    Head CT w/o Contrast:  IMPRESSION: Negative CT scan of the head.  Preliminary report per radiology.     Laboratory:  CBC: WNL (WBC 9.5, HGB 12.0, )    CMP: Glucose 108 high, Albumin 2.9 low, ALT 79 high, AST 63 high, o/w WNL (Creatinine 0.72)  INR: 1.09  PTT: 30  BNP: 212  Troponin I 1504: <0.015     Interventions:  1748 Lovenox 70 mg Subcutaneous     Emergency Department Course:  Nursing notes and vitals reviewed.  1451: I performed an exam of the patient as documented above.     1502: I discussed the patient with Dr. Shulka of radiology who reviewed the CT scan and agreed with diagnosis of PE.     1647: I updated and reassessed the patient.     I personally reviewed the laboratory results with the patient and answered all related questions prior to discharge.     Findings and plan explained to the Patient. Patient discharged home with instructions regarding supportive care, medications, and reasons to return. The importance of close follow-up was reviewed. The patient was prescribed Lovenox.      Impression & Plan      Medical Decision Making:  Kelly Lara is a 67 year old female with recent diagnosis of malignant pleural effusion and outpatient CT scan revealing peritoneal carcinomatosis and incidental finding of right lower lobe pulmonary embolism presenting to the ED for evaluation of PE. I did confirm the presence of pulmonary embolus with discussion with the  radiologist on call, who directly reviewed the images. I do not feel that a dedicated CT scan is necessary given the definitive diagnosis of pulmonary embolism. She has no signs of massive or submassive PE. Her PESI score is 99 putting her in the intermediate risk category. I recommended that she be admitted given her intermediate risk category, although the patient is asymptomatic. Given her asymptomatic status, the patient would like to be discharged home. I made her aware that she has put herself at some risk for worsening thrombosis, cardiac strain, syncope, cardiac arrest, and death. She understands these risks and still would like to be discharged home. She has capacity to make medical decisions. Given the presence of malignancy, Lovenox would be the most appropriate choice of anticoagulation at this time. She will be discharged to home on Lovenox BID until further discussion with oncology.     She was also made aware that the CT scan noted peritoneal carcinomatosis without definitive primary site of malignancy. Head CT was done to ensure there is no evidence of intracranial mass that may limit the use of anticoagulation. Head CT unremarkable. The patient is safe for discharge home with close follow up with primary care physician and referral to oncology.      Diagnosis:    ICD-10-CM   1. Other pulmonary embolism without acute cor pulmonale, unspecified chronicity (H) I26.99   2. Peritoneal carcinomatosis (H) C78.6    C80.1       Disposition:  Discharged to home with Lovenox.     Discharge Medications:  New Prescriptions    ENOXAPARIN (LOVENOX) 80 MG/0.8ML INJECTION    Inject 0.73 mLs (73 mg) Subcutaneous 2 times daily for 14 days         ILadarius, am serving as a scribe at 2:51 PM on 12/21/2017 to document services personally performed by Dr. Main, based on my observations and the provider's statements to me.    [facility] EMERGENCY DEPARTMENT       Charli Main,  MD  12/21/17 [id number]

## 2017-12-22 LAB — INTERPRETATION ECG - MUSE: NORMAL

## 2017-12-22 NOTE — ED NOTES
Pt received a Lovenox take home kit. Lovenox medication reviewed by this RN. Subcutaneous injection technic reviewed, pt demonstrated how to appropriately choose an injection site, clean it and her hands, remove the needle sheath and hold the needle to the site at a 90 degree angle. Medication administered by this RN at pt request. Pt states understanding to demonstration.

## 2018-04-02 ENCOUNTER — HOSPITAL ENCOUNTER (OUTPATIENT)
Dept: CT IMAGING | Facility: CLINIC | Age: 68
Discharge: HOME | End: 2018-04-02
Attending: INTERNAL MEDICINE | Admitting: INTERNAL MEDICINE
Payer: MEDICARE

## 2018-04-02 DIAGNOSIS — C48.2 PERITONEAL CARCINOMA (H): ICD-10-CM

## 2018-04-02 LAB
CREAT BLD-MCNC: 0.7 MG/DL (ref 0.52–1.04)
GFR SERPL CREATININE-BSD FRML MDRD: 83 ML/MIN/1.7M2

## 2018-04-02 PROCEDURE — 82565 ASSAY OF CREATININE: CPT

## 2018-04-02 PROCEDURE — 25000128 H RX IP 250 OP 636: Performed by: RADIOLOGY

## 2018-04-02 PROCEDURE — 71260 CT THORAX DX C+: CPT

## 2018-04-02 RX ORDER — IOPAMIDOL 755 MG/ML
500 INJECTION, SOLUTION INTRAVASCULAR ONCE
Status: COMPLETED | OUTPATIENT
Start: 2018-04-02 | End: 2018-04-02

## 2018-04-02 RX ADMIN — IOPAMIDOL 79 ML: 755 INJECTION, SOLUTION INTRAVENOUS at 09:39

## 2018-04-02 RX ADMIN — SODIUM CHLORIDE 60 ML: 9 INJECTION, SOLUTION INTRAVENOUS at 09:39

## 2018-04-06 PROBLEM — I26.99 PULMONARY EMBOLISM (H): Status: ACTIVE | Noted: 2018-04-06

## 2018-04-06 PROBLEM — C80.0 CARCINOMATOSIS (H): Status: ACTIVE | Noted: 2018-04-06

## 2018-04-19 ENCOUNTER — TRANSFERRED RECORDS (OUTPATIENT)
Dept: HEALTH INFORMATION MANAGEMENT | Facility: CLINIC | Age: 68
End: 2018-04-19

## 2018-04-26 ENCOUNTER — ANESTHESIA EVENT (OUTPATIENT)
Dept: SURGERY | Facility: CLINIC | Age: 68
DRG: 737 | End: 2018-04-26
Payer: MEDICARE

## 2018-04-26 ENCOUNTER — ANESTHESIA (OUTPATIENT)
Dept: SURGERY | Facility: CLINIC | Age: 68
DRG: 737 | End: 2018-04-26
Payer: MEDICARE

## 2018-04-26 ENCOUNTER — HOSPITAL ENCOUNTER (INPATIENT)
Facility: CLINIC | Age: 68
LOS: 5 days | Discharge: HOME | DRG: 737 | End: 2018-05-01
Attending: OBSTETRICS & GYNECOLOGY | Admitting: OBSTETRICS & GYNECOLOGY
Payer: MEDICARE

## 2018-04-26 ENCOUNTER — TRANSFERRED RECORDS (OUTPATIENT)
Dept: HEALTH INFORMATION MANAGEMENT | Facility: CLINIC | Age: 68
End: 2018-04-26

## 2018-04-26 DIAGNOSIS — C80.0 CARCINOMATOSIS (H): Primary | ICD-10-CM

## 2018-04-26 LAB
ABO + RH BLD: NORMAL
ABO + RH BLD: NORMAL
BLD GP AB SCN SERPL QL: NORMAL
BLOOD BANK CMNT PATIENT-IMP: NORMAL
HGB BLD-MCNC: 10.9 G/DL (ref 11.7–15.7)
INR PPP: 1.1 (ref 0.86–1.14)
PLATELET # BLD AUTO: 223 10E9/L (ref 150–450)
SPECIMEN EXP DATE BLD: NORMAL

## 2018-04-26 PROCEDURE — 12000000 ZZH R&B MED SURG/OB

## 2018-04-26 PROCEDURE — 37000008 ZZH ANESTHESIA TECHNICAL FEE, 1ST 30 MIN: Performed by: OBSTETRICS & GYNECOLOGY

## 2018-04-26 PROCEDURE — 25000128 H RX IP 250 OP 636: Performed by: ANESTHESIOLOGY

## 2018-04-26 PROCEDURE — 25000125 ZZHC RX 250: Performed by: NURSE ANESTHETIST, CERTIFIED REGISTERED

## 2018-04-26 PROCEDURE — 0DBB0ZZ EXCISION OF ILEUM, OPEN APPROACH: ICD-10-PCS | Performed by: OBSTETRICS & GYNECOLOGY

## 2018-04-26 PROCEDURE — 71000012 ZZH RECOVERY PHASE 1 LEVEL 1 FIRST HR: Performed by: OBSTETRICS & GYNECOLOGY

## 2018-04-26 PROCEDURE — 25000566 ZZH SEVOFLURANE, EA 15 MIN: Performed by: OBSTETRICS & GYNECOLOGY

## 2018-04-26 PROCEDURE — 36000093 ZZH SURGERY LEVEL 4 1ST 30 MIN: Performed by: OBSTETRICS & GYNECOLOGY

## 2018-04-26 PROCEDURE — 0UT90ZZ RESECTION OF UTERUS, OPEN APPROACH: ICD-10-PCS | Performed by: OBSTETRICS & GYNECOLOGY

## 2018-04-26 PROCEDURE — 88305 TISSUE EXAM BY PATHOLOGIST: CPT | Performed by: OBSTETRICS & GYNECOLOGY

## 2018-04-26 PROCEDURE — 88307 TISSUE EXAM BY PATHOLOGIST: CPT | Mod: 26,76 | Performed by: OBSTETRICS & GYNECOLOGY

## 2018-04-26 PROCEDURE — 27210794 ZZH OR GENERAL SUPPLY STERILE: Performed by: OBSTETRICS & GYNECOLOGY

## 2018-04-26 PROCEDURE — 85049 AUTOMATED PLATELET COUNT: CPT | Performed by: OBSTETRICS & GYNECOLOGY

## 2018-04-26 PROCEDURE — 82565 ASSAY OF CREATININE: CPT | Performed by: OBSTETRICS & GYNECOLOGY

## 2018-04-26 PROCEDURE — 85610 PROTHROMBIN TIME: CPT | Performed by: OBSTETRICS & GYNECOLOGY

## 2018-04-26 PROCEDURE — 36415 COLL VENOUS BLD VENIPUNCTURE: CPT | Performed by: ANESTHESIOLOGY

## 2018-04-26 PROCEDURE — 25000128 H RX IP 250 OP 636: Performed by: NURSE ANESTHETIST, CERTIFIED REGISTERED

## 2018-04-26 PROCEDURE — 86901 BLOOD TYPING SEROLOGIC RH(D): CPT | Performed by: ANESTHESIOLOGY

## 2018-04-26 PROCEDURE — 0UT20ZZ RESECTION OF BILATERAL OVARIES, OPEN APPROACH: ICD-10-PCS | Performed by: OBSTETRICS & GYNECOLOGY

## 2018-04-26 PROCEDURE — 36000063 ZZH SURGERY LEVEL 4 EA 15 ADDTL MIN: Performed by: OBSTETRICS & GYNECOLOGY

## 2018-04-26 PROCEDURE — 88307 TISSUE EXAM BY PATHOLOGIST: CPT | Performed by: OBSTETRICS & GYNECOLOGY

## 2018-04-26 PROCEDURE — 0D1N0ZP BYPASS SIGMOID COLON TO RECTUM, OPEN APPROACH: ICD-10-PCS | Performed by: OBSTETRICS & GYNECOLOGY

## 2018-04-26 PROCEDURE — 86900 BLOOD TYPING SEROLOGIC ABO: CPT | Performed by: ANESTHESIOLOGY

## 2018-04-26 PROCEDURE — 0DBL0ZZ EXCISION OF TRANSVERSE COLON, OPEN APPROACH: ICD-10-PCS | Performed by: OBSTETRICS & GYNECOLOGY

## 2018-04-26 PROCEDURE — 0D1L0ZN BYPASS TRANSVERSE COLON TO SIGMOID COLON, OPEN APPROACH: ICD-10-PCS | Performed by: OBSTETRICS & GYNECOLOGY

## 2018-04-26 PROCEDURE — 25800025 ZZH RX 258: Performed by: NURSE PRACTITIONER

## 2018-04-26 PROCEDURE — 85018 HEMOGLOBIN: CPT | Performed by: OBSTETRICS & GYNECOLOGY

## 2018-04-26 PROCEDURE — 25000128 H RX IP 250 OP 636: Performed by: NURSE PRACTITIONER

## 2018-04-26 PROCEDURE — 40000170 ZZH STATISTIC PRE-PROCEDURE ASSESSMENT II: Performed by: OBSTETRICS & GYNECOLOGY

## 2018-04-26 PROCEDURE — 0DBN0ZZ EXCISION OF SIGMOID COLON, OPEN APPROACH: ICD-10-PCS | Performed by: OBSTETRICS & GYNECOLOGY

## 2018-04-26 PROCEDURE — 25000128 H RX IP 250 OP 636: Performed by: OBSTETRICS & GYNECOLOGY

## 2018-04-26 PROCEDURE — 36415 COLL VENOUS BLD VENIPUNCTURE: CPT | Performed by: OBSTETRICS & GYNECOLOGY

## 2018-04-26 PROCEDURE — 0DBU0ZZ EXCISION OF OMENTUM, OPEN APPROACH: ICD-10-PCS | Performed by: OBSTETRICS & GYNECOLOGY

## 2018-04-26 PROCEDURE — 0DBP0ZZ EXCISION OF RECTUM, OPEN APPROACH: ICD-10-PCS | Performed by: OBSTETRICS & GYNECOLOGY

## 2018-04-26 PROCEDURE — 07TP0ZZ RESECTION OF SPLEEN, OPEN APPROACH: ICD-10-PCS | Performed by: OBSTETRICS & GYNECOLOGY

## 2018-04-26 PROCEDURE — 27210995 ZZH RX 272: Performed by: OBSTETRICS & GYNECOLOGY

## 2018-04-26 PROCEDURE — 88305 TISSUE EXAM BY PATHOLOGIST: CPT | Mod: 26 | Performed by: OBSTETRICS & GYNECOLOGY

## 2018-04-26 PROCEDURE — 86850 RBC ANTIBODY SCREEN: CPT | Performed by: ANESTHESIOLOGY

## 2018-04-26 PROCEDURE — 37000009 ZZH ANESTHESIA TECHNICAL FEE, EACH ADDTL 15 MIN: Performed by: OBSTETRICS & GYNECOLOGY

## 2018-04-26 PROCEDURE — C1765 ADHESION BARRIER: HCPCS | Performed by: OBSTETRICS & GYNECOLOGY

## 2018-04-26 PROCEDURE — 82565 ASSAY OF CREATININE: CPT | Performed by: NURSE PRACTITIONER

## 2018-04-26 PROCEDURE — 71000013 ZZH RECOVERY PHASE 1 LEVEL 1 EA ADDTL HR: Performed by: OBSTETRICS & GYNECOLOGY

## 2018-04-26 PROCEDURE — 0UT70ZZ RESECTION OF BILATERAL FALLOPIAN TUBES, OPEN APPROACH: ICD-10-PCS | Performed by: OBSTETRICS & GYNECOLOGY

## 2018-04-26 RX ORDER — ONDANSETRON 2 MG/ML
INJECTION INTRAMUSCULAR; INTRAVENOUS PRN
Status: DISCONTINUED | OUTPATIENT
Start: 2018-04-26 | End: 2018-04-26

## 2018-04-26 RX ORDER — NALOXONE HYDROCHLORIDE 0.4 MG/ML
.1-.4 INJECTION, SOLUTION INTRAMUSCULAR; INTRAVENOUS; SUBCUTANEOUS
Status: DISCONTINUED | OUTPATIENT
Start: 2018-04-26 | End: 2018-05-01 | Stop reason: HOSPADM

## 2018-04-26 RX ORDER — AMOXICILLIN 250 MG
2 CAPSULE ORAL 2 TIMES DAILY
Status: DISCONTINUED | OUTPATIENT
Start: 2018-04-26 | End: 2018-04-27

## 2018-04-26 RX ORDER — PROPOFOL 10 MG/ML
INJECTION, EMULSION INTRAVENOUS CONTINUOUS PRN
Status: DISCONTINUED | OUTPATIENT
Start: 2018-04-26 | End: 2018-04-26

## 2018-04-26 RX ORDER — ONDANSETRON 4 MG/1
4 TABLET, ORALLY DISINTEGRATING ORAL EVERY 30 MIN PRN
Status: DISCONTINUED | OUTPATIENT
Start: 2018-04-26 | End: 2018-04-26 | Stop reason: HOSPADM

## 2018-04-26 RX ORDER — PROCHLORPERAZINE MALEATE 5 MG
5 TABLET ORAL EVERY 6 HOURS PRN
Status: DISCONTINUED | OUTPATIENT
Start: 2018-04-26 | End: 2018-05-01 | Stop reason: HOSPADM

## 2018-04-26 RX ORDER — AMOXICILLIN 250 MG
1 CAPSULE ORAL 2 TIMES DAILY
Status: DISCONTINUED | OUTPATIENT
Start: 2018-04-26 | End: 2018-04-27

## 2018-04-26 RX ORDER — LIDOCAINE 40 MG/G
CREAM TOPICAL
Status: DISCONTINUED | OUTPATIENT
Start: 2018-04-26 | End: 2018-05-01 | Stop reason: HOSPADM

## 2018-04-26 RX ORDER — LEVOTHYROXINE SODIUM 112 UG/1
112 TABLET ORAL DAILY
Status: DISCONTINUED | OUTPATIENT
Start: 2018-04-26 | End: 2018-05-01 | Stop reason: HOSPADM

## 2018-04-26 RX ORDER — CEFAZOLIN SODIUM 1 G/3ML
1 INJECTION, POWDER, FOR SOLUTION INTRAMUSCULAR; INTRAVENOUS EVERY 8 HOURS
Status: COMPLETED | OUTPATIENT
Start: 2018-04-27 | End: 2018-04-27

## 2018-04-26 RX ORDER — SENNOSIDES A AND B 8.6 MG/1
1-3 TABLET, FILM COATED ORAL 2 TIMES DAILY PRN
Qty: 30 TABLET | Refills: 0 | Status: ON HOLD | OUTPATIENT
Start: 2018-04-26 | End: 2022-06-23

## 2018-04-26 RX ORDER — NALOXONE HYDROCHLORIDE 0.4 MG/ML
.1-.4 INJECTION, SOLUTION INTRAMUSCULAR; INTRAVENOUS; SUBCUTANEOUS
Status: DISCONTINUED | OUTPATIENT
Start: 2018-04-26 | End: 2018-04-26

## 2018-04-26 RX ORDER — DEXAMETHASONE SODIUM PHOSPHATE 4 MG/ML
INJECTION, SOLUTION INTRA-ARTICULAR; INTRALESIONAL; INTRAMUSCULAR; INTRAVENOUS; SOFT TISSUE PRN
Status: DISCONTINUED | OUTPATIENT
Start: 2018-04-26 | End: 2018-04-26

## 2018-04-26 RX ORDER — CEFAZOLIN SODIUM 2 G/100ML
2 INJECTION, SOLUTION INTRAVENOUS
Status: COMPLETED | OUTPATIENT
Start: 2018-04-26 | End: 2018-04-26

## 2018-04-26 RX ORDER — CEFAZOLIN SODIUM 1 G/3ML
1 INJECTION, POWDER, FOR SOLUTION INTRAMUSCULAR; INTRAVENOUS SEE ADMIN INSTRUCTIONS
Status: DISCONTINUED | OUTPATIENT
Start: 2018-04-26 | End: 2018-04-26 | Stop reason: HOSPADM

## 2018-04-26 RX ORDER — LIDOCAINE HYDROCHLORIDE 20 MG/ML
INJECTION, SOLUTION INFILTRATION; PERINEURAL PRN
Status: DISCONTINUED | OUTPATIENT
Start: 2018-04-26 | End: 2018-04-26

## 2018-04-26 RX ORDER — ONDANSETRON 2 MG/ML
4 INJECTION INTRAMUSCULAR; INTRAVENOUS EVERY 30 MIN PRN
Status: DISCONTINUED | OUTPATIENT
Start: 2018-04-26 | End: 2018-04-26 | Stop reason: HOSPADM

## 2018-04-26 RX ORDER — CEFAZOLIN SODIUM 1 G/3ML
1 INJECTION, POWDER, FOR SOLUTION INTRAMUSCULAR; INTRAVENOUS EVERY 8 HOURS
Status: DISCONTINUED | OUTPATIENT
Start: 2018-04-26 | End: 2018-04-26

## 2018-04-26 RX ORDER — NEOSTIGMINE METHYLSULFATE 1 MG/ML
VIAL (ML) INJECTION PRN
Status: DISCONTINUED | OUTPATIENT
Start: 2018-04-26 | End: 2018-04-26

## 2018-04-26 RX ORDER — ONDANSETRON 2 MG/ML
4 INJECTION INTRAMUSCULAR; INTRAVENOUS EVERY 6 HOURS PRN
Status: DISCONTINUED | OUTPATIENT
Start: 2018-04-26 | End: 2018-05-01 | Stop reason: HOSPADM

## 2018-04-26 RX ORDER — LABETALOL HYDROCHLORIDE 5 MG/ML
10 INJECTION, SOLUTION INTRAVENOUS
Status: DISCONTINUED | OUTPATIENT
Start: 2018-04-26 | End: 2018-04-26 | Stop reason: HOSPADM

## 2018-04-26 RX ORDER — PROPOFOL 10 MG/ML
INJECTION, EMULSION INTRAVENOUS PRN
Status: DISCONTINUED | OUTPATIENT
Start: 2018-04-26 | End: 2018-04-26

## 2018-04-26 RX ORDER — HYDROCODONE BITARTRATE AND ACETAMINOPHEN 5; 325 MG/1; MG/1
1-2 TABLET ORAL EVERY 4 HOURS PRN
Qty: 30 TABLET | Refills: 0 | Status: ON HOLD | OUTPATIENT
Start: 2018-04-26 | End: 2022-06-23

## 2018-04-26 RX ORDER — VECURONIUM BROMIDE 1 MG/ML
INJECTION, POWDER, LYOPHILIZED, FOR SOLUTION INTRAVENOUS PRN
Status: DISCONTINUED | OUTPATIENT
Start: 2018-04-26 | End: 2018-04-26

## 2018-04-26 RX ORDER — FENTANYL CITRATE 50 UG/ML
25-50 INJECTION, SOLUTION INTRAMUSCULAR; INTRAVENOUS
Status: DISCONTINUED | OUTPATIENT
Start: 2018-04-26 | End: 2018-04-26 | Stop reason: HOSPADM

## 2018-04-26 RX ORDER — FENTANYL CITRATE 50 UG/ML
INJECTION, SOLUTION INTRAMUSCULAR; INTRAVENOUS PRN
Status: DISCONTINUED | OUTPATIENT
Start: 2018-04-26 | End: 2018-04-26

## 2018-04-26 RX ORDER — MAGNESIUM HYDROXIDE 1200 MG/15ML
LIQUID ORAL PRN
Status: DISCONTINUED | OUTPATIENT
Start: 2018-04-26 | End: 2018-04-26 | Stop reason: HOSPADM

## 2018-04-26 RX ORDER — ONDANSETRON 4 MG/1
4 TABLET, ORALLY DISINTEGRATING ORAL EVERY 6 HOURS PRN
Status: DISCONTINUED | OUTPATIENT
Start: 2018-04-26 | End: 2018-05-01 | Stop reason: HOSPADM

## 2018-04-26 RX ORDER — HYDROCODONE BITARTRATE AND ACETAMINOPHEN 5; 325 MG/1; MG/1
1-2 TABLET ORAL EVERY 4 HOURS PRN
Status: DISCONTINUED | OUTPATIENT
Start: 2018-04-26 | End: 2018-05-01 | Stop reason: HOSPADM

## 2018-04-26 RX ORDER — HYDROMORPHONE HYDROCHLORIDE 1 MG/ML
.3-.5 INJECTION, SOLUTION INTRAMUSCULAR; INTRAVENOUS; SUBCUTANEOUS EVERY 5 MIN PRN
Status: DISCONTINUED | OUTPATIENT
Start: 2018-04-26 | End: 2018-04-26 | Stop reason: HOSPADM

## 2018-04-26 RX ORDER — SODIUM CHLORIDE, SODIUM LACTATE, POTASSIUM CHLORIDE, CALCIUM CHLORIDE 600; 310; 30; 20 MG/100ML; MG/100ML; MG/100ML; MG/100ML
INJECTION, SOLUTION INTRAVENOUS CONTINUOUS
Status: DISCONTINUED | OUTPATIENT
Start: 2018-04-26 | End: 2018-04-26 | Stop reason: HOSPADM

## 2018-04-26 RX ORDER — SODIUM CHLORIDE, SODIUM LACTATE, POTASSIUM CHLORIDE, CALCIUM CHLORIDE 600; 310; 30; 20 MG/100ML; MG/100ML; MG/100ML; MG/100ML
INJECTION, SOLUTION INTRAVENOUS CONTINUOUS PRN
Status: DISCONTINUED | OUTPATIENT
Start: 2018-04-26 | End: 2018-04-26

## 2018-04-26 RX ORDER — DEXTROSE MONOHYDRATE, SODIUM CHLORIDE, AND POTASSIUM CHLORIDE 50; 1.49; 4.5 G/1000ML; G/1000ML; G/1000ML
INJECTION, SOLUTION INTRAVENOUS CONTINUOUS
Status: DISCONTINUED | OUTPATIENT
Start: 2018-04-26 | End: 2018-04-30

## 2018-04-26 RX ORDER — GLYCOPYRROLATE 0.2 MG/ML
INJECTION, SOLUTION INTRAMUSCULAR; INTRAVENOUS PRN
Status: DISCONTINUED | OUTPATIENT
Start: 2018-04-26 | End: 2018-04-26

## 2018-04-26 RX ADMIN — LIDOCAINE HYDROCHLORIDE 100 MG: 20 INJECTION, SOLUTION INFILTRATION; PERINEURAL at 15:51

## 2018-04-26 RX ADMIN — HYDROMORPHONE HYDROCHLORIDE 0.5 MG: 1 INJECTION, SOLUTION INTRAMUSCULAR; INTRAVENOUS; SUBCUTANEOUS at 17:46

## 2018-04-26 RX ADMIN — ROCURONIUM BROMIDE 50 MG: 10 INJECTION INTRAVENOUS at 15:51

## 2018-04-26 RX ADMIN — PROPOFOL 50 MCG/KG/MIN: 10 INJECTION, EMULSION INTRAVENOUS at 15:51

## 2018-04-26 RX ADMIN — HYDROMORPHONE HYDROCHLORIDE 0.5 MG: 1 INJECTION, SOLUTION INTRAMUSCULAR; INTRAVENOUS; SUBCUTANEOUS at 20:59

## 2018-04-26 RX ADMIN — HYDROMORPHONE HYDROCHLORIDE 0.5 MG: 1 INJECTION, SOLUTION INTRAMUSCULAR; INTRAVENOUS; SUBCUTANEOUS at 20:23

## 2018-04-26 RX ADMIN — SODIUM CHLORIDE, POTASSIUM CHLORIDE, SODIUM LACTATE AND CALCIUM CHLORIDE: 600; 310; 30; 20 INJECTION, SOLUTION INTRAVENOUS at 17:13

## 2018-04-26 RX ADMIN — SODIUM CHLORIDE, POTASSIUM CHLORIDE, SODIUM LACTATE AND CALCIUM CHLORIDE: 600; 310; 30; 20 INJECTION, SOLUTION INTRAVENOUS at 18:32

## 2018-04-26 RX ADMIN — PHENYLEPHRINE HYDROCHLORIDE 100 MCG: 10 INJECTION, SOLUTION INTRAMUSCULAR; INTRAVENOUS; SUBCUTANEOUS at 16:33

## 2018-04-26 RX ADMIN — ONDANSETRON 4 MG: 2 INJECTION INTRAMUSCULAR; INTRAVENOUS at 19:06

## 2018-04-26 RX ADMIN — NEOSTIGMINE METHYLSULFATE 1 MG: 1 INJECTION, SOLUTION INTRAVENOUS at 19:10

## 2018-04-26 RX ADMIN — FENTANYL CITRATE 50 MCG: 50 INJECTION, SOLUTION INTRAMUSCULAR; INTRAVENOUS at 15:59

## 2018-04-26 RX ADMIN — POTASSIUM CHLORIDE, DEXTROSE MONOHYDRATE AND SODIUM CHLORIDE: 150; 5; 450 INJECTION, SOLUTION INTRAVENOUS at 23:00

## 2018-04-26 RX ADMIN — PROPOFOL 140 MG: 10 INJECTION, EMULSION INTRAVENOUS at 15:51

## 2018-04-26 RX ADMIN — VECURONIUM BROMIDE 1 MG: 1 INJECTION, POWDER, LYOPHILIZED, FOR SOLUTION INTRAVENOUS at 18:15

## 2018-04-26 RX ADMIN — PHENYLEPHRINE HYDROCHLORIDE 100 MCG: 10 INJECTION, SOLUTION INTRAMUSCULAR; INTRAVENOUS; SUBCUTANEOUS at 18:35

## 2018-04-26 RX ADMIN — CEFAZOLIN SODIUM 1 G: 2 INJECTION, SOLUTION INTRAVENOUS at 17:56

## 2018-04-26 RX ADMIN — MIDAZOLAM 2 MG: 1 INJECTION INTRAMUSCULAR; INTRAVENOUS at 15:42

## 2018-04-26 RX ADMIN — DEXMEDETOMIDINE HYDROCHLORIDE 12 MCG: 100 INJECTION, SOLUTION INTRAVENOUS at 19:10

## 2018-04-26 RX ADMIN — FENTANYL CITRATE 100 MCG: 50 INJECTION, SOLUTION INTRAMUSCULAR; INTRAVENOUS at 15:51

## 2018-04-26 RX ADMIN — VECURONIUM BROMIDE 1 MG: 1 INJECTION, POWDER, LYOPHILIZED, FOR SOLUTION INTRAVENOUS at 17:42

## 2018-04-26 RX ADMIN — CEFAZOLIN SODIUM 2 G: 2 INJECTION, SOLUTION INTRAVENOUS at 16:00

## 2018-04-26 RX ADMIN — NEOSTIGMINE METHYLSULFATE 3 MG: 1 INJECTION, SOLUTION INTRAVENOUS at 19:09

## 2018-04-26 RX ADMIN — SODIUM CHLORIDE, POTASSIUM CHLORIDE, SODIUM LACTATE AND CALCIUM CHLORIDE: 600; 310; 30; 20 INJECTION, SOLUTION INTRAVENOUS at 16:20

## 2018-04-26 RX ADMIN — GLYCOPYRROLATE 0.4 MG: 0.2 INJECTION, SOLUTION INTRAMUSCULAR; INTRAVENOUS at 19:09

## 2018-04-26 RX ADMIN — VECURONIUM BROMIDE 1 MG: 1 INJECTION, POWDER, LYOPHILIZED, FOR SOLUTION INTRAVENOUS at 16:43

## 2018-04-26 RX ADMIN — HYDROMORPHONE HYDROCHLORIDE 0.5 MG: 1 INJECTION, SOLUTION INTRAMUSCULAR; INTRAVENOUS; SUBCUTANEOUS at 20:48

## 2018-04-26 RX ADMIN — SODIUM CHLORIDE, POTASSIUM CHLORIDE, SODIUM LACTATE AND CALCIUM CHLORIDE: 600; 310; 30; 20 INJECTION, SOLUTION INTRAVENOUS at 15:59

## 2018-04-26 RX ADMIN — SODIUM CHLORIDE, POTASSIUM CHLORIDE, SODIUM LACTATE AND CALCIUM CHLORIDE: 600; 310; 30; 20 INJECTION, SOLUTION INTRAVENOUS at 13:40

## 2018-04-26 RX ADMIN — VECURONIUM BROMIDE 1 MG: 1 INJECTION, POWDER, LYOPHILIZED, FOR SOLUTION INTRAVENOUS at 17:58

## 2018-04-26 RX ADMIN — GLYCOPYRROLATE 0.4 MG: 0.2 INJECTION, SOLUTION INTRAMUSCULAR; INTRAVENOUS at 19:10

## 2018-04-26 RX ADMIN — HYDROMORPHONE HYDROCHLORIDE 0.5 MG: 1 INJECTION, SOLUTION INTRAMUSCULAR; INTRAVENOUS; SUBCUTANEOUS at 16:22

## 2018-04-26 RX ADMIN — DEXMEDETOMIDINE HYDROCHLORIDE 8 MCG: 100 INJECTION, SOLUTION INTRAVENOUS at 19:12

## 2018-04-26 RX ADMIN — FENTANYL CITRATE 50 MCG: 50 INJECTION, SOLUTION INTRAMUSCULAR; INTRAVENOUS at 16:11

## 2018-04-26 RX ADMIN — HYDROMORPHONE HYDROCHLORIDE: 10 INJECTION, SOLUTION INTRAMUSCULAR; INTRAVENOUS; SUBCUTANEOUS at 21:17

## 2018-04-26 RX ADMIN — DEXAMETHASONE SODIUM PHOSPHATE 4 MG: 4 INJECTION, SOLUTION INTRA-ARTICULAR; INTRALESIONAL; INTRAMUSCULAR; INTRAVENOUS; SOFT TISSUE at 16:03

## 2018-04-26 RX ADMIN — VECURONIUM BROMIDE 1 MG: 1 INJECTION, POWDER, LYOPHILIZED, FOR SOLUTION INTRAVENOUS at 17:25

## 2018-04-26 RX ADMIN — FENTANYL CITRATE 50 MCG: 50 INJECTION, SOLUTION INTRAMUSCULAR; INTRAVENOUS at 17:13

## 2018-04-26 RX ADMIN — ONDANSETRON 4 MG: 2 INJECTION INTRAMUSCULAR; INTRAVENOUS at 18:50

## 2018-04-26 ASSESSMENT — COPD QUESTIONNAIRES: COPD: 0

## 2018-04-26 ASSESSMENT — ENCOUNTER SYMPTOMS: SEIZURES: 0

## 2018-04-26 NOTE — PROCEDURES
POST OPERATIVE NOTE-IMMEDIATE :  Preoperative Diagnosis:  CARCINOMATOSIS     Postoperative Diagnosis:  Same     NATIONAL GUIDELINE REFERENCED FOR TREATMENT PLANNING: NCCN    Procedures: Exploratory laparotomy, radical resection of pelvic tumor with HUMZA-BSO  bilateral ureterolysis, rectosigmoid resection, omentectomy, splenectomy and partial transverse colectomy, distal ileal resection with enteroenterostomy  .   Prosthetic Devices:  None    Surgeon(s) and Assistants (if any):  Surgeon(s):  Adrianna Escalera MD Casey, Ann Catherine, MD  Circulator: James Thomason RN  Scrub Person: Татьяна Gonzalez    Anesthesia:  General    Drains:  Aguilar, ELVIS    Specimens:  Uterus, cervix, bilateral fallopian tubes and ovaries, spleen, omentum, right para-colic peritoneum, rectosigmoid.     Complications: none    Findings/Conclusions: Decent response to chemo. Multiple loops of bowl with remaining disease to rectosigmoid and omentum.     Estimated Blood Loss: 200cc     Condition on discharge from OR:  Satisfactory      Ale Medellin   On Behalf of  Surgeon(s):  Adrianna Escalera MD Casey, Ann Catherine, MD

## 2018-04-26 NOTE — ANESTHESIA PREPROCEDURE EVALUATION
Procedure: Procedure(s):  COMBINED HYSTERECTOMY TOTAL ABDOMINAL, SALPINGO-OOPHORECTOMY, NODE DISSECTION, TUMOR DEBULKING  OMENTECTOMY  COLECTOMY WITHOUT COLOSTOMY  Preop diagnosis: CARCINOMATOSIS     Allergies   Allergen Reactions     No Known Allergies      Past Medical History:   Diagnosis Date     Carcinomatosis (H)      Carcinomatosis (H) 11/2017     Hx pulmonary embolism      Hypothyroid      Metastatic carcinoma (H)      Past Surgical History:   Procedure Laterality Date     C LIGATE FALLOPIAN TUBE,POSTPARTUM  1973     HC COLONOSCOPY THRU STOMA W BIOPSY/CAUTERY TUMOR/POLYP/LESION  11/2008    recheck in 5 years     HC REMOVE TONSILS/ADENOIDS,<11 Y/O      age 4     LAPAROSCOPIC APPENDECTOMY  8/14/2012    Procedure: LAPAROSCOPIC APPENDECTOMY;  LAPAROSCOPIC APPENDECTOMY ;  Surgeon: Jane Mak DO;  Location:  OR     Social History   Substance Use Topics     Smoking status: Never Smoker     Smokeless tobacco: Never Used     Alcohol use 3.5 oz/week     7 Standard drinks or equivalent per week     Prior to Admission medications    Medication Sig Start Date End Date Taking? Authorizing Provider   Rivaroxaban (XARELTO PO) Take 20 mg by mouth every morning   Yes Reported, Patient   SYNTHROID 112 MCG tablet Take 1 tablet (112 mcg) by mouth daily 6/20/17  Yes Josefina Vidales MD     Current Facility-Administered Medications Ordered in Epic   Medication Dose Route Frequency Last Rate Last Dose     ceFAZolin (ANCEF) 1 g vial to attach to  ml bag for ADULT or 50 ml bag for PEDS  1 g Intravenous See Admin Instructions         ceFAZolin (ANCEF) intermittent infusion 2 g in 100 mL dextrose PRE-MIX  2 g Intravenous Pre-Op/Pre-procedure x 1 dose         lactated ringers infusion   Intravenous Continuous 25 mL/hr at 04/26/18 1340       lidocaine 1 % 1 mL  1 mL Other Q1H PRN         No current [facility]-ordered outpatient prescriptions on file.       lactated ringers 25 mL/hr at 04/26/18 1340     Wt Readings from Last 1  Encounters:   04/26/18 73 kg (160 lb 15 oz)     Temp Readings from Last 1 Encounters:   04/26/18 36.6  C (97.8  F) (Temporal)     BP Readings from Last 6 Encounters:   04/26/18 130/75   12/21/17 130/86   11/28/17 122/80   06/20/17 114/74   05/22/17 110/70   05/06/16 120/80     Pulse Readings from Last 4 Encounters:   11/28/17 68   06/20/17 76   05/22/17 76   05/06/16 64     Resp Readings from Last 1 Encounters:   04/26/18 18     SpO2 Readings from Last 1 Encounters:   04/26/18 97%     Recent Labs   Lab Test  12/21/17   1504  06/20/17   1215   08/14/12   1640   NA  135   --    --   139   POTASSIUM  3.8   --    --   3.8   CHLORIDE  100   --    --   101   CO2  27   --    --   27   ANIONGAP  8   --    --   11   GLC  108*  80   < >  95   BUN  20   --    --   14   CR  0.72   --    --   0.68   YURY  8.5   --    --   8.8    < > = values in this interval not displayed.     Recent Labs   Lab Test  12/21/17   1504  08/14/12   1640   AST  63*  31   ALT  79*  15   ALKPHOS  79  86   BILITOTAL  0.4  1.2   LIPASE   --   51     Recent Labs   Lab Test  12/21/17   1504  11/28/17   1415   WBC  9.5  7.5   HGB  12.0  12.6   PLT  368  404*     Recent Labs   Lab Test  04/26/18   1256   ABO  A   RH  Neg     Recent Labs   Lab Test  12/21/17   1504   INR  1.09   PTT  30      Recent Labs   Lab Test  12/21/17   1504   TROPI  <0.015     No results for input(s): PH, PCO2, PO2, HCO3 in the last 80889 hours.  No results for input(s): HCG in the last 58239 hours.  Recent Results (from the past 744 hour(s))   CT Chest/Abdomen/Pelvis w Contrast    Narrative    CT CHEST/ABDOMEN/PELVIS WITH CONTRAST April 2, 2018 9:48 AM     HISTORY: Peritoneal carcinoma (H).    COMPARISON: CT abdomen/pelvis 8/14/2012. CT chest, abdomen and pelvis  12/21/2017.    TECHNIQUE: Axial images from the thoracic inlet to the symphysis are  performed with additional coronal reformatted images. 79 mL of Isovue  370 are given intravenously.  Radiation dose for this scan was  reduced  using automated exposure control, adjustment of the mA and/or kV  according to patient size, or iterative reconstruction technique. Oral  contrast is also given.    FINDINGS:     Chest: Area of subpleural nodularity or scarring at the posterior  right lung base on series 6, image 200 measures 0.4 cm. Calcified  granulomas noted in the posteromedial left costophrenic angle on image  226. Lungs are otherwise clear. No infiltrate, consolidation or mass.  No pleural or pericardial fluid. Left pleural effusion has resolved  since prior CT. Heart is normal in size. Esophagus is unremarkable. A  few coronary artery calcifications are present. No enlarged axillary  or intrathoracic lymph nodes.    Abdomen: A few tiny subcentimeter cysts are scattered throughout the  liver, unchanged since prior exam. Follow-up required. No new liver  lesions are appreciated. The spleen, gallbladder, pancreas, adrenal  glands and kidneys are unremarkable. No hydronephrosis or renal  calculi. Aorta demonstrates scattered calcified plaque without  aneurysm or dissection. Areas of peritoneal distortion and bandlike  areas of soft tissue thickening are noted in the region of the  transverse colon from the left upper quadrant on series 2, image 57  down through 69. This appears improved since the prior chest, abdomen  and pelvis CT. Additional areas of mesenteric stranding and thickening  are also improved. Mild thickening along the serosal surface of the  mid sigmoid colon appears relatively stable with additional soft  tissue nodularity adjacent to a small bowel loop on series 2, image  101 measuring 4.0 x 1.3 cm, previously 4.2 x 1.5 cm which may be  slightly improved. No evidence of bowel dilatation or obstruction.  Mild ascites seen on prior CT has resolved.    Pelvis: The bladder is decompressed but otherwise unremarkable. The  uterus and rectum are unremarkable. No enlarged pelvic lymph nodes or  free pelvic fluid. Bone window  examination demonstrates mild  degenerative spine changes. No aggressive appearing bone lesions are  appreciated.      Impression    IMPRESSION:  1. Slight interval improvement within the peritoneal soft tissue  thickening when compared to prior exam. Serosal thickening in the  region of the mid sigmoid colon may be new since prior exam but is  difficult to compare as oral contrast was in the sigmoid colon on  prior exam as it is on the current study. This causes mild narrowing  in the region of the mid sigmoid colon. No enlarged lymph nodes.  Resolution of prior ascites.  2. Calcified granuloma in the area of subpleural nodular scarring as  described. Nodular scarring appears new since prior exam. Prior left  pleural effusion has resolved. Lungs are otherwise clear. No other  lung lesions are appreciated. No enlarged axillary or intrathoracic  lymph nodes.    ALEXA BARRIGA MD           Anesthesia Evaluation     . Pt has had prior anesthetic.     No history of anesthetic complications          ROS/MED HX    ENT/Pulmonary:      (-) asthma, COPD and sleep apnea   Neurologic:      (-) seizures and CVA   Cardiovascular:  - neg cardiovascular ROS       METS/Exercise Tolerance:     Hematologic:     (+) History of blood clots pt is anticoagulated, -      Musculoskeletal:         GI/Hepatic:        (-) GERD and liver disease   Renal/Genitourinary:      (-) renal disease   Endo:     (+) thyroid problem hypothyroidism, .      Psychiatric:         Infectious Disease:         Malignancy:   (+) Malignancy (ovarian)           Other:                     Physical Exam  Normal systems: dental    Airway   Mallampati: II  TM distance: >3 FB  Neck ROM: full    Dental     Cardiovascular   Rhythm and rate: regular      Pulmonary    breath sounds clear to auscultation                    Anesthesia Plan      History & Physical Review  History and physical reviewed and following examination; no interval change.    ASA Status:  3 .    NPO  Status:  > 8 hours    Plan for General and ETT   PONV prophylaxis:  Ondansetron (or other 5HT-3) and Dexamethasone or Solumedrol  Propofol gtt       Postoperative Care      Consents  Anesthetic plan, risks, benefits and alternatives discussed with:  Patient..                          .

## 2018-04-26 NOTE — IP AVS SNAPSHOT
MRN:[id number]                      After Visit Summary   4/26/2018    Kelly Lara    MRN: [id number]           Thank you!     Thank you for choosing [facility] for your care. Our goal is always to provide you with excellent care. Hearing back from our patients is one way we can continue to improve our services. Please take a few minutes to complete the written survey that you may receive in the mail after you visit with us. Thank you!        Patient Information     Date Of Birth          1950        Designated Caregiver       Most Recent Value    Caregiver    Will someone help with your care after discharge? yes    Name of designated caregiver Alburnett - Spouse    Phone number of caregiver [phone] - cell    Caregiver address same as patient      About your hospital stay     You were admitted on:  April 26, 2018 You last received care in the:  [facility] [address] [facility]    You were discharged on:  May 1, 2018        Reason for your hospital stay       Surgery                  Who to Call     For medical emergencies, please call 911.  For non-urgent questions about your medical care, please call your primary care provider or clinic, [phone]  For questions related to your surgery, please call your surgery clinic        Attending Provider     Provider Specialty    Adrianna Escalera MD Oncology       Primary Care Provider Office Phone # Fax #    Tabitha Bruno [phone]      After Care Instructions     Activity       Your activity upon discharge: activity as tolerated            Diet       Follow this diet upon discharge: Regular            Discharge Instructions       Discharge instructions following your gynecologic procedure:  Returning to work/activities:  -Nothing per vagina for 8 weeks  -Typically patients can expect to return to light work within 2-4 weeks.  -No driving or operating machinery while taking prescription pain medication.  -You should avoid  heavy lifting until your follow up visit. No lifting greater than 20 pounds for 2 weeks.     Diet:  -as tolerated    Pain:  -Typically there is a minimal to moderate amount of incisional pain after surgery.  - An ice pack is recommended intermittently for the first 48 hours to help reduce pain & swelling.  -Most patients are provided a prescription for medication to take on a short term basis to help relieve the discomfort.  -If pain medication refills are needed we require you contact our office during regular business hours. (8am-5pm Monday-Friday)  -Please be aware that pain medication can cause constipation.  It may be recommended to take an over the counter stool softener as directed to prevent constipation.     Constipation:  -The pain medication you are prescribed at the time of your surgery can cause constipation.   -We recommend that you take an over the counter stool softener such as colace or senokot-s on a regular basis until you have stopped the pain medication or bowel movements are regular. You make take 1-4 tablets twice per day.   -For constipation lasting 3 days please take Milk of Magnesia or Miralax as directed on the bottles. If you have taken Milk of Magnesia and Miralax and still have not had a bowel movement please contact your our office.    Incision/Wound care:  -If you have a clear plastic dressing over a gauze on your incision, remove these 1-2 days after discharging from the hospital.   -You many shower 24hrs after surgery.  It is ok to get the steri-strips/incision wet while showering & pat the area dry with a clean towel  -No bathtubs, hot tubs or swimming is recommended for at least 2 weeks.    Vaginal drainage/spotting:  -Following a hysterectomy you may have vaginal drainage/spotting for up to 4-6 weeks from surgery. If more than a regular period please call our office.     Bladder symptoms:  -You may also have bladder irritation of difficulty starting urination from your surgery  "and this is normal. Please call if you have pain or burning when you urinate or fevers.     Follow up care:  -You will be asked to see Dr. Escalera's Nurse Practitioner at 2 weeks from surgery. Follow up with your Oncologist in [place] as scheduled.   -If you don't already have an appointment, please contact the office and our staff will happily assist you in scheduling your appointment. Bring your insurance card & government issued ID card to your appointment.    CALL YOUR SURGEON @[phone] FOR ANY OF THE FOLLOWING:  -Temperature greater than 101 degrees Fahrenheit  -Increased pain  -Increased shortness of breath  -Increased bleeding or drainage or vaginal bleeding greater than a regular menses.   -Pus-like drainage, increasing redness, swelling, tenderness, or warmth at the incision site  -Persistent nausea or vomiting            Patient care order       Continue the lovenox twice daily through Thursday 5/3. On AM of 5/4 do not take your lovenox. Start your xarelto at your 20 mg daily dose in the am on 5/4.                  Follow-up Appointments     Follow-up and recommended labs and tests        Dr. Escalera or her NP at 2 weeks from surgery for staple removal.                  Further instructions from your care team       Dr[facility]  [address]  [facility] [address] [phone]         Pending Results     No orders found from 4/24/2018 to 4/27/2018.            Admission Information     Date & Time Provider Department Dept. Phone    4/26/2018 Adrianna Escalera MD [facility] [address] [facility] [phone]      Your Vitals Were     Blood Pressure Pulse Temperature Respirations Height Weight    133/62 (BP Location: Left arm) 77 98  F (36.7  C) (Oral) 16 1.676 m (5' 6\") 70.9 kg (156 lb 6.4 oz)    Last Period Pulse Oximetry BMI (Body Mass Index)             01/01/2000 94% 25.24 kg/m2         MyChart Information     [facility] lets you send messages to your doctor, view your test " "results, renew your prescriptions, schedule appointments and more. To sign up, go to [email or web address] . Click on \"Log in\" on the left side of the screen, which will take you to the Welcome page. Then click on \"Sign up Now\" on the right side of the page.     You will be asked to enter the access code listed below, as well as some personal information. Please follow the directions to create your username and password.     Your access code is: [email or web address]  Expires: 2018 10:58 AM     Your access code will  in 90 days. If you need help or a new code, please call your [facility] clinic or [phone].        Care EveryWhere ID     This is your Care EveryWhere ID. This could be used by other organizations to access your [facility] medical records  [id number]        Equal Access to Services     [facility] : Yesi Khan, monica vale, chanel russell, eugenia pope . So [facility] [phone].    ATENCIÓN: Si habla español, tiene a hernandez disposición servicios gratuitos de asistencia lingüística. Llame al [phone].    We comply with applicable federal civil rights laws and [place] laws. We do not discriminate on the basis of race, color, national origin, age, disability, sex, sexual orientation, or gender identity.               Review of your medicines      START taking        Dose / Directions    enoxaparin 100 MG/ML injection   Commonly known as:  LOVENOX   Used for:  Carcinomatosis (H)   Notes to Patient:  Continue the lovenox twice daily through Thursday 5/3. On AM of  do not take your lovenox. Start your xarelto at your 20 mg daily dose in the am on .         Dose:  1 mg/kg   Inject 0.73 mLs (73 mg) Subcutaneous 2 times daily   Quantity:  14 Syringe   Refills:  0       HYDROcodone-acetaminophen 5-325 MG per tablet   Commonly known as:  NORCO   Used for:  Carcinomatosis (H)        Dose:  1-2 tablet   Take 1-2 tablets by mouth every 4 hours " as needed for pain maximum 10 tablet(s) per day   Quantity:  30 tablet   Refills:  0       senna 8.6 MG tablet   Commonly known as:  SENOKOT   Used for:  Carcinomatosis (H)        Dose:  1-3 tablet   Take 1-3 tablets by mouth 2 times daily as needed for constipation   Quantity:  30 tablet   Refills:  0         CONTINUE these medicines which have NOT CHANGED        Dose / Directions    SYNTHROID 112 MCG tablet   Used for:  Acquired hypothyroidism   Generic drug:  levothyroxine        Dose:  112 mcg   Take 1 tablet (112 mcg) by mouth daily   Quantity:  90 tablet   Refills:  3         STOP taking     XARELTO PO                Where to get your medicines      These medications were sent to [facility] [place], [place] - [address]  [address], [place] [address]     Phone:  [phone]     senna 8.6 MG tablet         Some of these will need a paper prescription and others can be bought over the counter. Ask your nurse if you have questions.     Bring a paper prescription for each of these medications     enoxaparin 100 MG/ML injection    HYDROcodone-acetaminophen 5-325 MG per tablet                Protect others around you: Learn how to safely use, store and throw away your medicines at www.disposemymeds.org.        Information about OPIOIDS     PRESCRIPTION OPIOIDS: WHAT YOU NEED TO KNOW   You have a prescription for an opioid (narcotic) pain medicine. Opioids can cause addiction. If you have a history of chemical dependency of any type, you are at a higher risk of becoming addicted to opioids. Only take this medicine after all other options have been tried. Take it for as short a time and as few doses as possible.     Do not:    Drive. If you drive while taking these medicines, you could be arrested for driving under the influence (DUI).    Operate heavy machinery    Do any other dangerous activities while taking these medicines.     Drink any alcohol while taking these medicines.       Take with any other medicines that contain acetaminophen. Read all labels carefully. Look for the word  acetaminophen  or  Tylenol.  Ask your pharmacist if you have questions or are unsure.    Store your pills in a secure place, locked if possible. We will not replace any lost or stolen medicine. If you don t finish your medicine, please throw away (dispose) as directed by your pharmacist. The [place] Pollution Control Agency has more information about safe disposal: https://www.pca.[facility].mn.us/living-green/managing-unwanted-medications    All opioids tend to cause constipation. Drink plenty of water and eat foods that have a lot of fiber, such as fruits, vegetables, prune juice, apple juice and high-fiber cereal. Take a laxative (Miralax, milk of magnesia, Colace, Senna) if you don t move your bowels at least every other day.              Medication List: This is a list of all your medications and when to take them. Check marks below indicate your daily home schedule. Keep this list as a reference.      Medications           Morning Afternoon Evening Bedtime As Needed    enoxaparin 100 MG/ML injection   Commonly known as:  LOVENOX   Inject 0.73 mLs (73 mg) Subcutaneous 2 times daily   Last time this was given:  70 mg on 5/1/2018  9:20 AM   Notes to Patient:  Continue the lovenox twice daily through Thursday 5/3. On AM of 5/4 do not take your lovenox. Start your xarelto at your 20 mg daily dose in the am on 5/4.                                       HYDROcodone-acetaminophen 5-325 MG per tablet   Commonly known as:  NORCO   Take 1-2 tablets by mouth every 4 hours as needed for pain maximum 10 tablet(s) per day                                   senna 8.6 MG tablet   Commonly known as:  SENOKOT   Take 1-3 tablets by mouth 2 times daily as needed for constipation                                   SYNTHROID 112 MCG tablet   Take 1 tablet (112 mcg) by mouth daily   Last time this was given:  112 mcg on 5/1/2018   9:22 AM   Generic drug:  levothyroxine

## 2018-04-26 NOTE — PROGRESS NOTES
Admission medication history interview status for the 4/26/2018  admission is complete. See EPIC admission navigator for prior to admission medications     Medication history source reliability:Good    Medication history interview source(s):Patient    Medication history resources (including written lists, pill bottles, clinic record):Patient mailed in her medication list prior to surgery    Primary pharmacy.[facility]    Additional medication history information not noted on PTA med list :None    Time spent in this activity: 40 minutes    Prior to Admission medications    Medication Sig Last Dose Taking? Auth Provider   Rivaroxaban (XARELTO PO) Take 20 mg by mouth every morning 4/21/2018 at AM Yes Reported, Patient   SYNTHROID 112 MCG tablet Take 1 tablet (112 mcg) by mouth daily 4/25/2018 at AM Yes Josefina Vidales MD

## 2018-04-26 NOTE — DISCHARGE SUMMARY
"HOSPITAL DISCHARGE SUMMARY    Patient Name: Kelly Lara  YOB: 1950 Age: 68 year old  Medical Record Number: [id number]  Primary Physician: Tabitha Bruno  Phone: [phone]  Admission Date: 4/26/2018  Discharge Date: 5/1/2018    Kelly Lara  will be discharged from [facility] to Home.    PRINCIPAL DISCHARGE DIAGNOSIS: stage IRINA right fallopian tube carcinoma    BRIEF HOSPITAL COURSE: This 68 year old female admitted following the below listed procedure. She tolerated the procedure well. Uneventful post operative course and discharge to home on POD #5 with adequate pain control, tolerating orals, voiding and ambulating.    PROCEDURES PERFORMED DURING HOSPITALIZATION:   Exploratory laparotomy, radical resection of pelvic tumor with HUMZA-BSO  bilateral ureterolysis, rectosigmoid resection, omentectomy, splenectomy and partial transverse colectomy, distal ileal resection with enteroenterostomy    COMPLICATIONS IN HOSPITAL: None    CONSULTATIONS:  none    PERTINENT FINDINGS/RESULTS AT DISCHARGE:   /77 (BP Location: Left arm)  Pulse 77  Temp 96.7  F (35.9  C) (Oral)  Resp 16  Ht 1.676 m (5' 6\")  Wt 72.8 kg (160 lb 9.6 oz)  LMP 01/01/2000  SpO2 94%  BMI 25.92 kg/m2    Latest Laboratory Results:  Chem:  CBC RESULTS:   Recent Labs   Lab Test  04/30/18   0751  04/29/18   0735   WBC   --   13.0*   RBC   --   2.93*   HGB  10.7*  9.9*   HCT   --   29.5*   MCV   --   101*   MCH   --   33.8*   MCHC   --   33.6   RDW   --   14.3   PLT   --   220     Last Basic Metabolic Panel:  Lab Results   Component Value Date     04/29/2018      Lab Results   Component Value Date    POTASSIUM 3.6 04/29/2018     Lab Results   Component Value Date    CHLORIDE 104 04/29/2018     Lab Results   Component Value Date    YURY 8.7 04/29/2018     Lab Results   Component Value Date    CO2 30 04/29/2018     Lab Results   Component Value Date    BUN 6 04/29/2018     Lab Results   Component " Value Date    CR 0.66 04/29/2018     Lab Results   Component Value Date     04/29/2018     Pathology  Patient Name: VAMSHI JAQUEZ   MR#: [id number]   Specimen #: [id number]   Collected: 4/26/2018   Received: 4/27/2018   Reported: 4/30/2018 13:55   Ordering Phy(s): KIM ORDOÑEZ     For improved result formatting, select 'View Enhanced Report Format' under    Linked Documents section.     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     C: Spleen, resection   - Nonneoplastic splenic tissue     D: Uterus, bilateral fallopian tubes and ovaries, rectosigmoid colon,   resection   - Poorly differentiated adenocarcinoma compatible with right fallopian   tube primary, with metastases to left   fallopian tube, bilateral ovaries and pericolonic/periuterine soft tissue   (see synoptic report)      Atrophic endometrial tissue, benign cervical tissue     E: Small intestine, terminal ileum, resection   - Nonneoplastic small intestinal mucosa   >>>>>    COMMENT:   Morphologic features are that of a poorly differentiated adenocarcinoma   compatible with a gynecologic primary   origin.  Evaluation is complicated by this patient's history of previous   therapy.  Given the presence of a   discrete nodule within the right fallopian tube fimbria and absence of   obvious primary lesions in bilateral   ovaries, cervix and endometrium, a fallopian tube primary is favored.     Report Name: Ovary, Fallopian Tube, or Primary Peritoneum        Status: Submitted Checklist Inst: 1       Last Updated By: Sabino Kennedy M.D., 04/30/2018 13:55:32   Part(s) Involved:   D: Uterus, cervix, bilateral fallopian tubes and ovaries, recto-sigmoid   colon     Synoptic Report:     SPECIMEN     Procedure:         - Total hysterectomy and bilateral salpingo-oophorectomy     Specimen Integrity of Right Ovary:         - Capsule intact     Specimen Integrity of Left Ovary:         - Capsule intact     Specimen Integrity of the Right Fallopian Tube:         - Serosa intact     Specimen Integrity of the Left Fallopian Tube:         - Serosa intact     TUMOR     Tumor Site:         - Right fallopian tube     Histologic Type:         - Serous carcinoma     Histologic Grade:         - High grade     Tumor Size       Tumor Size: 0.6 Centimeters (cm)     Tumor Extent       Ovarian Surface Involvement:           - Present       Fallopian Tube Surface Involvement:           - Present       Implants:           - Present (sites) - Taya colonic soft tissue, peritoneum,   bilateral           ovarian surface       Other Tissue / Organ Involvement:           - Right ovary           - Left ovary           - Left fallopian tube           - Pelvic peritoneum           - Abdominal peritoneum       Largest Extrapelvic Peritoneal Focus:           - Macroscopic (2 cm or less)       Peritoneal Ascitic Fluid:           - Not submitted / unknown     Accessory Findings       Treatment Effect:           - No definite or minimal response identified (chemotherapy   response           score [CRS] 1)     LYMPH NODES     Regional Lymph Nodes:         - No lymph nodes submitted or found     PATHOLOGIC STAGE CLASSIFICATION (PTNM, AJCC 8TH EDITION)     TNM Descriptors:         - y (post-treatment)     Primary Tumor (pT):         - pT3b     Regional Lymph Nodes (pN):         - pNX     Distant Metastasis (pM):         - pM1a     FIGO STAGE     FIGO Stage:         - IRINA   IMPORTANT PENDING TEST RESULTS:  none    CONDITION AT DISCHARGE:     Stabilized    DISCHARGE ORDERS  Current Discharge Medication List      START taking these medications    Details   enoxaparin (LOVENOX) 100 MG/ML injection Inject 0.73 mLs (73 mg) Subcutaneous 2 times daily  Qty: 14 Syringe, Refills: 0    Associated Diagnoses: Carcinomatosis (H)      HYDROcodone-acetaminophen (NORCO) 5-325 MG per tablet Take 1-2 tablets by mouth every 4 hours as needed for pain maximum 10 tablet(s) per day  Qty: 30 tablet, Refills: 0    Associated Diagnoses: Carcinomatosis (H)      senna (SENOKOT) 8.6 MG tablet Take 1-3 tablets by mouth 2 times daily as needed for constipation  Qty: 30 tablet, Refills: 0    Associated Diagnoses: Carcinomatosis (H)         CONTINUE these medications which have NOT CHANGED    Details   SYNTHROID 112 MCG tablet Take 1 tablet (112 mcg) by mouth daily  Qty: 90 tablet, Refills: 3    Associated Diagnoses: Acquired hypothyroidism         STOP taking these medications       Rivaroxaban (XARELTO PO) Comments:   Reason for Stopping:               DISCHARGE INSTRUCTION.  Returning to work/activities:  -Nothing per vagina for 8 weeks  -Typically patients can expect to return to light work within 2-4 weeks.  -No driving or operating machinery while taking prescription pain medication.  -You should avoid heavy lifting until your follow up visit. No lifting greater than 20 pounds for 2 weeks.     Diet:  -as tolerated    Pain:  -Motrin (or other NSAIDs) are a good additional therapy and recommend trying this in addition to other pain relievers.  -Typically there is a minimal to moderate amount of incisional pain after surgery.  - An ice pack is recommended intermittently for the first 48 hours to help reduce pain & swelling.  -Most patients are provided a prescription for medication to take on a short term basis to help relieve the discomfort.  -If pain medication refills are needed we require you contact our office during regular business hours. (8am-5pm Monday-Friday)  -Please be  aware that pain medication can cause constipation.  It may be recommended to take an over the counter stool softener as directed to prevent constipation.  -You may also have bladder irritation from your surgery and this is normal. Please call if you have blood in your urine, fevers, or are unable to empty your bladder.     Incision/Wound care:  -A small amount of bleeding or drainage is expected in the first 24-48 hours.  -You many shower 24hrs after surgery.  It is ok to get the steri-strips/incision wet while showering & pat the area dry with a clean towel  -No bathtubs, hot tubs or swimming is recommended for at least 2 weeks.  -Expect to have vaginal drainage/spotting for up to 4-6 weeks from surgery. If more than a regular period please call our office.       Follow up care:  -You will be asked to see Dr. Escalera's office at 2 weeks from surgery for a post op visit.    -If you don't already have an appointment, please contact the office and our staff will happily assist you in scheduling your appointment. Bring your insurance card & government issued ID card to your appointment.    CALL YOUR SURGEON @ [phone] FOR ANY OF THE FOLLOWING:  -Temperature greater than 101 degrees Fahrenheit  -Increased pain  -Increased shortness of breath  -Increased bleeding or drainage or vaginal bleeding greater than a regular menses.   -Pus-like drainage, increasing redness, swelling, tenderness, or warmth at the incision site  -Persistent nausea or vomiting  -Difficulty having a bowel movement    FOLLOW-UP: Kelly Lara should see Tabitha Bruno PRN.    Specialty follow-up: as above.     AFTER HOSPITAL RECOMMENDATIONS  As above.      Physician(s) in addition to primary physician who should receive a copy:  Tabitha Bruno PA-C

## 2018-04-26 NOTE — IP AVS SNAPSHOT
[address]    [place] [address]    Phone:  [phone]                                       After Visit Summary   4/26/2018    Kelly Lara    MRN: [id number]           After Visit Summary Signature Page     I have received my discharge instructions, and my questions have been answered. I have discussed any challenges I see with this plan with the nurse or doctor.    ..........................................................................................................................................  Patient/Patient Representative Signature      ..........................................................................................................................................  Patient Representative Print Name and Relationship to Patient    ..................................................               ................................................  Date                                            Time    ..........................................................................................................................................  Reviewed by Signature/Title    ...................................................              ..............................................  Date                                                            Time

## 2018-04-27 LAB
ALBUMIN SERPL-MCNC: 2.8 G/DL (ref 3.4–5)
ALP SERPL-CCNC: 46 U/L (ref 40–150)
ALT SERPL W P-5'-P-CCNC: 16 U/L (ref 0–50)
AMYLASE SERPL-CCNC: 22 U/L (ref 30–110)
ANION GAP SERPL CALCULATED.3IONS-SCNC: 7 MMOL/L (ref 3–14)
AST SERPL W P-5'-P-CCNC: 19 U/L (ref 0–45)
BASOPHILS # BLD AUTO: 0 10E9/L (ref 0–0.2)
BASOPHILS NFR BLD AUTO: 0.1 %
BILIRUB SERPL-MCNC: 0.6 MG/DL (ref 0.2–1.3)
BUN SERPL-MCNC: 17 MG/DL (ref 7–30)
CALCIUM SERPL-MCNC: 8 MG/DL (ref 8.5–10.1)
CHLORIDE SERPL-SCNC: 105 MMOL/L (ref 94–109)
CO2 SERPL-SCNC: 26 MMOL/L (ref 20–32)
CREAT SERPL-MCNC: 0.73 MG/DL (ref 0.52–1.04)
CREAT SERPL-MCNC: 0.75 MG/DL (ref 0.52–1.04)
DIFFERENTIAL METHOD BLD: ABNORMAL
EOSINOPHIL # BLD AUTO: 0 10E9/L (ref 0–0.7)
EOSINOPHIL NFR BLD AUTO: 0 %
ERYTHROCYTE [DISTWIDTH] IN BLOOD BY AUTOMATED COUNT: 15 % (ref 10–15)
GFR SERPL CREATININE-BSD FRML MDRD: 76 ML/MIN/1.7M2
GFR SERPL CREATININE-BSD FRML MDRD: 78 ML/MIN/1.7M2
GLUCOSE SERPL-MCNC: 218 MG/DL (ref 70–99)
HCT VFR BLD AUTO: 31.6 % (ref 35–47)
HGB BLD-MCNC: 10.8 G/DL (ref 11.7–15.7)
IMM GRANULOCYTES # BLD: 0 10E9/L (ref 0–0.4)
IMM GRANULOCYTES NFR BLD: 0.3 %
LYMPHOCYTES # BLD AUTO: 0.4 10E9/L (ref 0.8–5.3)
LYMPHOCYTES NFR BLD AUTO: 2.5 %
MCH RBC QN AUTO: 34.1 PG (ref 26.5–33)
MCHC RBC AUTO-ENTMCNC: 34.2 G/DL (ref 31.5–36.5)
MCV RBC AUTO: 100 FL (ref 78–100)
MONOCYTES # BLD AUTO: 0.8 10E9/L (ref 0–1.3)
MONOCYTES NFR BLD AUTO: 5.8 %
NEUTROPHILS # BLD AUTO: 13.3 10E9/L (ref 1.6–8.3)
NEUTROPHILS NFR BLD AUTO: 91.3 %
NRBC # BLD AUTO: 0 10*3/UL
NRBC BLD AUTO-RTO: 0 /100
PLATELET # BLD AUTO: 208 10E9/L (ref 150–450)
POTASSIUM SERPL-SCNC: 3.9 MMOL/L (ref 3.4–5.3)
PROT SERPL-MCNC: 6.1 G/DL (ref 6.8–8.8)
RBC # BLD AUTO: 3.17 10E12/L (ref 3.8–5.2)
SODIUM SERPL-SCNC: 138 MMOL/L (ref 133–144)
WBC # BLD AUTO: 14.6 10E9/L (ref 4–11)

## 2018-04-27 PROCEDURE — 80053 COMPREHEN METABOLIC PANEL: CPT | Performed by: NURSE PRACTITIONER

## 2018-04-27 PROCEDURE — 82150 ASSAY OF AMYLASE: CPT | Performed by: NURSE PRACTITIONER

## 2018-04-27 PROCEDURE — 25800025 ZZH RX 258: Performed by: OBSTETRICS & GYNECOLOGY

## 2018-04-27 PROCEDURE — 12000000 ZZH R&B MED SURG/OB

## 2018-04-27 PROCEDURE — 36415 COLL VENOUS BLD VENIPUNCTURE: CPT | Performed by: NURSE PRACTITIONER

## 2018-04-27 PROCEDURE — 25000128 H RX IP 250 OP 636: Performed by: NURSE PRACTITIONER

## 2018-04-27 PROCEDURE — 25000128 H RX IP 250 OP 636: Performed by: OBSTETRICS & GYNECOLOGY

## 2018-04-27 PROCEDURE — 85025 COMPLETE CBC W/AUTO DIFF WBC: CPT | Performed by: NURSE PRACTITIONER

## 2018-04-27 PROCEDURE — 25800025 ZZH RX 258: Performed by: NURSE PRACTITIONER

## 2018-04-27 PROCEDURE — 25000132 ZZH RX MED GY IP 250 OP 250 PS 637: Mod: GY | Performed by: OBSTETRICS & GYNECOLOGY

## 2018-04-27 PROCEDURE — A9270 NON-COVERED ITEM OR SERVICE: HCPCS | Mod: GY | Performed by: OBSTETRICS & GYNECOLOGY

## 2018-04-27 RX ORDER — HYDRALAZINE HYDROCHLORIDE 20 MG/ML
5 INJECTION INTRAMUSCULAR; INTRAVENOUS ONCE
Status: COMPLETED | OUTPATIENT
Start: 2018-04-27 | End: 2018-04-27

## 2018-04-27 RX ORDER — ACETAMINOPHEN 10 MG/ML
1000 INJECTION, SOLUTION INTRAVENOUS EVERY 6 HOURS PRN
Status: DISCONTINUED | OUTPATIENT
Start: 2018-04-27 | End: 2018-04-30

## 2018-04-27 RX ORDER — FLUORIDE TOOTHPASTE
15 TOOTHPASTE DENTAL 4 TIMES DAILY PRN
Status: DISCONTINUED | OUTPATIENT
Start: 2018-04-27 | End: 2018-05-01 | Stop reason: HOSPADM

## 2018-04-27 RX ADMIN — CEFAZOLIN SODIUM 1 G: 1 INJECTION, POWDER, FOR SOLUTION INTRAMUSCULAR; INTRAVENOUS at 02:05

## 2018-04-27 RX ADMIN — CEFAZOLIN SODIUM 1 G: 1 INJECTION, POWDER, FOR SOLUTION INTRAMUSCULAR; INTRAVENOUS at 10:16

## 2018-04-27 RX ADMIN — POTASSIUM CHLORIDE, DEXTROSE MONOHYDRATE AND SODIUM CHLORIDE: 150; 5; 450 INJECTION, SOLUTION INTRAVENOUS at 16:18

## 2018-04-27 RX ADMIN — ACETAMINOPHEN 1000 MG: 10 INJECTION, SOLUTION INTRAVENOUS at 12:39

## 2018-04-27 RX ADMIN — Medication 1 SPRAY: at 12:33

## 2018-04-27 RX ADMIN — POTASSIUM CHLORIDE, DEXTROSE MONOHYDRATE AND SODIUM CHLORIDE: 150; 5; 450 INJECTION, SOLUTION INTRAVENOUS at 06:45

## 2018-04-27 RX ADMIN — Medication 1 SPRAY: at 16:18

## 2018-04-27 RX ADMIN — ENOXAPARIN SODIUM 40 MG: 40 INJECTION SUBCUTANEOUS at 09:46

## 2018-04-27 RX ADMIN — HYDRALAZINE HYDROCHLORIDE 5 MG: 20 INJECTION INTRAMUSCULAR; INTRAVENOUS at 02:05

## 2018-04-27 NOTE — ANESTHESIA CARE TRANSFER NOTE
Patient: Kelly Lara    Procedure(s):  EXPLORATORY LAPAROTOMY; RADICAL RESECTION OF PELVIC TUMOR WITH TOTAL ABDOMINAL HYSTERECTOMY AND BILATERAL SALPINGO-OOPHORECTOMY; OMENTECTOMY; TUMOR DEBULKING; SPLENECTOMY; BILATERAL URETEROLYSIS; MOBILIZATION OF SPLENIC FLEXURE; RECTOSIGMOID RESECTION; COLOPROCTOSTOMY; TRANSVERSE COLECTOMY;  COLOCOLOSTOMY; ILEAL RESECTION WITH ENTEROENTEROSTOMY - Wound Class: II-Clean Contaminated   - Wound Class: II-Clean Contaminated   - Wound Class: II-Clean Contaminated   - Wound Class: II-Clean Contaminated   - Wound Class: II-Clean Contaminated    Diagnosis: CARCINOMATOSIS   Diagnosis Additional Information: No value filed.    Anesthesia Type:   General, ETT     Note:  Airway :Face Mask  Patient transferred to:PACU  Comments: Pt awake and able to verbalize needs. ALL monitors on and audible. Vital signs stable. Spontaneous respiration without difficulty. o2 sats 100% on 10Lo2 per face mask. Pt denies pain. Report given to PACU RN.Handoff Report: Identifed the Patient, Identified the Reponsible Provider, Reviewed the pertinent medical history, Discussed the surgical course, Reviewed Intra-OP anesthesia mangement and issues during anesthesia, Set expectations for post-procedure period and Allowed opportunity for questions and acknowledgement of understanding      Vitals: (Last set prior to Anesthesia Care Transfer)    CRNA VITALS  4/26/2018 1923 - 4/26/2018 2003      4/26/2018             NIBP: 138/74    Pulse: 75    SpO2: 100 %                Electronically Signed By: EVANGELISTA Martin CRNA  April 26, 2018  8:03 PM

## 2018-04-27 NOTE — PLAN OF CARE
Problem: Patient Care Overview  Goal: Plan of Care/Patient Progress Review  Pt arrived to the unit at 2215, accompanied by daughters. POD 0 HUMZA-BSO/splenectomy/omenectomy. Pt A&O, drowsy but easily aroused. VSS on 2L via NC. Capno WDL. Dilaudid PCA in place, dose set at 0.1mg. Midline incision, sanguinous drainage marked. Abdominal binder in place, ice to area. ELVIS to L abdomen, draining bloody/bright red. Aguilar patent. NPO. Denies nausea. Will continue to monitor.

## 2018-04-27 NOTE — PLAN OF CARE
Problem: Patient Care Overview  Goal: Plan of Care/Patient Progress Review  Outcome: Improving  POD #1.  VSS, weaned to room air but put back on 1 liter when napping.  Pain well managed with dilaudid, PRN dose of IV tylenol also given.  NPO.  Ambulated in the crespo and sat up in chair.  Aguilar positional.  ELVIS patent with 155ml output.  No change in midline dressing saturation.  Capno discontinued.  No bowel sounds, not passing gas but belching.  Lungs diminished, IS encouraged.  IVF rate decreased.  Cont to monitor.

## 2018-04-27 NOTE — PLAN OF CARE
Problem: Surgery Nonspecified (Adult)  Goal: Signs and Symptoms of Listed Potential Problems Will be Absent, Minimized or Managed (Surgery Nonspecified)  Signs and symptoms of listed potential problems will be absent, minimized or managed by discharge/transition of care (reference Surgery Nonspecified (Adult) CPG).   Outcome: No Change  POD 0. A&Ox4, drowsy. Minimal to no pain at rest most of the night, increased w/ movement. Pain increased towards AM, 8/10; PCA rate increased from 0.1 to 0.2 mg q 10 min at 0630, use encouraged. BP elevated (197/83), MD notified; given PRN Hydralazine, recheck 162/75. Weaned to 1L NC at 0630, capno WDL. Trouble taking deep breaths, IS encouraged. Midline incision dressing CDI, drainage marked-extended. ELVIS dressing CDI, OP bloody. NPO. Aguilar patent, UOP adequate. BS not audible, no gas; belching. PIV infusing. Unable to walk this AM d/t pain, PCA increased; will walk when pain better controlled.. Pt appeared to rest comfortably throughout the night. Will continue to monitor.

## 2018-04-27 NOTE — PROVIDER NOTIFICATION
MD Notification    Notified Person: MD    Notified Person Name: Dr. Igor Reese    Notification Date/Time: April 27, 2018 0150    Notification Interaction: Telephone    Purpose of Notification: BP trending up, 0100 /83. Pt asymptomatic w/o pain.    Orders Received: Once dose Hydralazine 5 mg IV    Comments:

## 2018-04-27 NOTE — OP NOTE
Procedure Date: 04/26/2018      PREOPERATIVE DIAGNOSIS:  Peritoneal carcinomatosis.      POSTOPERATIVE DIAGNOSIS:  Peritoneal carcinomatosis with final diagnosis pending pathologic evaluation of all specimens.      SURGEON:  RASTA Escalera MD      ASSISTANT:  Ale Medellin NP      ANESTHESIA:  General endotracheal.      PROCEDURES:  Exploratory laparotomy, radical resection of pelvic tumor with TH-BSO  bilateral ureterolysis, rectosigmoid resection, omentectomy, splenectomy and partial transverse colectomy, distal ileal resection with enteroenterostomy.  She had a colocolostomy and a coloproctostomy.        INDICATIONS FOR THE PROCEDURE:  The patient is a 68-year-old female who at the end of 2017 developed worsening shortness of breath.  A chest x-ray revealed a large left-sided pleural effusion.  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  CT scan of the chest, abdomen and pelvis revealed segmental and subsegmental right lower lobe PE as peritoneal and omental nodularity consistent with peritoneal carcinomatosis and a 2.4 x 1.2 x 1.7 cm soft tissue mass posterior to the uterus as well as small to moderate ascites.  CA-125 was 1598.  A PET CT scan confirmed multiple hypermetabolic foci within the abdomen and pelvis.  There was FDG uptake in the region of the descending colon.  There was a large left-sided pleural effusion.  She was seen in consultation and at that point we elected to proceed with neoadjuvant chemotherapy.  She subsequently received 4 cycles of carboplatin and Taxol.  BRCA testing was negative and her last CA-125 had decreased considerably to 13.5 IU/mL.  CT scan of chest, abdomen and pelvis on 04/03 showed slight interval improvement within the peritoneal soft tissue thickening when compared to the previous.  There was serosal  thickening in the region of the midsigmoid  colon and there was resolution of the ascites and pleural effusion.  She tolerated chemotherapy well and had a much improved functional status.  We elected to proceed with cytoreductive surgery at this point.      FINDINGS:  The patient had no significant ascites.  She had no peritoneal studding.  There was some thickening of the peritoneum on the vesicouterine peritoneum in the pelvis and also along the right pericolic gutter peritoneum.  She had filmy adhesions over the liver capsule where she clearly had disease that had been treated, but there was no palpable nodularity on the diaphragms or underneath the liver.  She had no significant palpable lymph nodes in the pelvic or periaortic region.  She was notable to have a residual omental cake that was shriveled up between the greater curvature of the stomach involving the distal transverse colon and extending into the perisplenic tissues and up to the splenic hilum.  In her pelvis clearly she had had significant disease which had undergone some treatment, but there was still no normalization of the tissues in this area.  She had thickening of the colon.  She had a barely recognizable fundus, some fimbria that you could make out, but all the other structures were fairly agglutinated.  She had thickening of the vesicouterine peritoneum and in the distal ileum she had an accordion-like effect on the small bowel mesentery which clearly was the site of potential obstruction.  At the completion of her surgery today she had no residual visible disease.      PROCEDURE IN DETAIL:  The patient was taken to the operating room.  She had received broad-spectrum antibiotic prophylaxis.  Knee-high sequential compression devices were placed on her lower extremities.  She was placed in the supine position on the operating room table.  General endotracheal anesthesia was administered in the usual fashion.  She was then repositioned in low  lithotomy position using well-padded Dandre stirrups.  Her arms were left extended on arm boards just below her shoulder levels.  She was prepped and draped in the usual sterile fashion including insertion of a 16-[place] Aguilar catheter into her bladder.  When  she was prepped and draped, a timeout was conducted and everyone agreed upon the procedure.  We started with a midline vertical incision from the symphysis pubis to the infra epigastric area.  It was taken down through subcutaneous tissue and through the fascia with electrocautery.  The muscles were divided in the midline.  Posterior rectus sheath was opened and the peritoneal cavity was entered and the incision was extended superiorly and inferiorly.  We did a manual evaluation of the abdomen and pelvis and found the above-noted findings.  A Bookwalter retractor was placed around the incision and most of our time initially was spent in the upper abdomen.  We used lateral and superior retractor blades to optimize visualization.  I did mobilize the white line of Toldt along the ascending and descending colon, mobilizing both the hepatic and splenic flexures.  I peeled off the pericolic gutter peritoneum where it was still somewhat thickened on the right hand side and sent this off for pathologic evaluation.  We then worked on freeing up the residual small amount of omentum from the transverse colon with its peritoneal attachments.  We then divided the gastrocolic ligament free of the greater curvature of the stomach very carefully with the LigaSure device.  It was somewhat agglutinated to the spleen as we got further out and you could see that there was tumor involvement in this whole area and there was still a mass of tumor in the gastrocolic ligament itself extending out to the spleen.  We  out the thickened tissue near the inferior spleen tip and mobilized the colon as much as we could.  We did resect a portion of the distal transverse colon with a  MAVERICK-80 through avascular spaces on either side of the involved area of the gastrocolic ligament and transverse mesocolon.  We then came across the transverse mesocolon with the LigaSure device.  This specimen was passed off the table.  We then mobilized the spleen posteriorly and superiorly.  We completed  it out from the stomach and the residual omentum superiorly and medially.  We were able to mobilize the spleen up and divide the soft tissues on either side of the splenic vessels.  We then clamped the splenic vessels off and divided the vessels with the LigaSure right near the splenic hilum.  We then used the ties to tie down the vessels in the splenic pedicle and then suture ligated them as well doing at least 2 major ties around these blood vessels.  The spleen and the surrounding tissue were passed off the table as well.  We now made out way down into the pelvis.  In the pelvis you could see that everything had been agglutinated.  We took the pelvic peritoneum and mobilized it medially.  This included the vesicouterine peritoneum which was divided.  We isolated the ovarian vessels, they were cauterized and divided with the LigaSure device.  We isolated the ureters and they were tagged with vessel loops and then worked off of the surrounding tissue which was actually fairly easy.  We opened up the perirectal and perivesical spaces.  We opened up the presacral space and took down the adhesions of the colon to the presacral space.  We worked the bladder off the vesicouterine peritoneum.  Initially we could not clearly see the fundus, but once we had  started to take the bladder dissection off in a retroperitoneal fashion we could now make out the uterine body and the dissection went quite nicely once we were past the peritoneal reflection.  Once this was performed, we  out the colon posteriorly and we were able to take the soft tissues at the isthmus including the main trunk of the uterine  artery with the LigaSure device.  We then used straight Zeppelins to come along the cardinal ligaments,  the cardinal ligament from the cervix with a scalpel, dividing the tissues once they were clamped and then suture ligating them with 0 Vicryl suture on a CT2 needle.  We continued this all the way down to the cervicovaginal junction, making sure that the bladder was down adequately.  We entered the anterior vagina at the cervicovaginal junction, circumferentially divided the cervix free.  We were then able to get into the rectovaginal septum and develop this.  We then started dividing the perirectal tissues below the peritoneal reflection and in doing so, were able to mobilize the rectum up to below the peritoneal reflection.  We used an articulating TA 55 stapler to come across this area and it was divided.  We then went proximal and in the mid sigmoid colon we divided the colon in this area and divided the mesentery as well including the superior hemorrhoidal vessels.  The pelvic specimen was passed off and included the uterus,  cervix, tubes, ovaries, pelvic peritoneum and rectosigmoid colon.  We then ran the small bowel and we found a small area of really very tightly accordion tissue in the distal ileum.  There was good distal ileum both proximal and distal to this area, so I elected to resect that as well which we did with a MAVERICK 60 staplers proximally and distally to the affected area and then used the LigaSure device to come across the mesentery.  We palpated all the retroperitoneal areas and found no suspicious lymph nodes that needed to be resected.  We did a classic enteroenterostomy side-to-side functional end-to-end anastomosis with MAVERICK stapler and a TA stapler and we closed the mesenteric defect with a 2-0 Vicryl suture on an SH needle.  We did the same thing with the first colocolostomy bringing the transverse colon and the descending colon together and then we worked on the  coloproctostomy.  At this point, the bowel had been completely mobilized so that the cecum was sitting in the mid upper abdomen below the liver.  There was no tension on the anastomosis.  I did take the distal part of the descending colon/proximal sigmoid colon and I took the fat off surrounding the stapled area.  I used a US surgical automatic purse-stringing device to put a pursestring around this.  We took the staple line out.  We triangulated the bowel.  We put a 28 mm CEA anvil in this area and cinched it down around the neck of the anvil.  We then dilated the anus and we placed the shaft of the device in the anus and it went up to approximately 18 cm.  We advanced the trocar through the staple line of the rectum.  We removed it and we brought the anvil to the shaft and attached the two devices together.  We then brought the two areas of bowel together, stapled them and we removed the device without any difficulty whatsoever.  A bubble test was performed and there was no evidence of leak.  A 15-Sami hubless drain was placed through a left lower quadrant stab incision and laid into the presacral space.  This was sutured at the skin with 2-0 silk suture.  The abdomen was then copiously irrigated.  Excellent hemostasis had been obtained.  We used Seprafilm on the surfaces underneath the incision and any raw surfaces that we had encountered.  We then, in a clean closure technique, switched out the towels around the incisions and all the instruments.  We closed the abdomen in a mass closure using #1 looped PDS suture from superior and inferior aspects to the midline.  This was reinforced with #1 Ethibond sutures in an interrupted fashion.  Subcutaneous tissue was irrigated and the skin was reapproximated with staples.  The patient's anesthesia was reversed.  She was extubated and taken to recovery room in stable condition.  Estimated blood loss 200 mL.      Ale Medellin was my assist throughout the case.  She was  there for all 3 hours of the case and helped with every aspect of the case.  She helped with her portion of the hysterectomy.  She gave me necessary countertraction and exposure all throughout the case and helped me with both opening and closing of the abdomen.         KIM ORDOÑEZ MD             D: 2018   T: 2018   MT: USMAN      Name:     VAMSHI JAQUEZ   MRN:      [id number]        Account:        [id number]   :      1950           Procedure Date: 2018      Document: [id number]       cc: FREDERICK GUILLERMO MD

## 2018-04-27 NOTE — ANESTHESIA POSTPROCEDURE EVALUATION
Patient: Kelly Lara    Procedure(s):  EXPLORATORY LAPAROTOMY; RADICAL RESECTION OF PELVIC TUMOR WITH TOTAL ABDOMINAL HYSTERECTOMY AND BILATERAL SALPINGO-OOPHORECTOMY; OMENTECTOMY; TUMOR DEBULKING; SPLENECTOMY; BILATERAL URETEROLYSIS; MOBILIZATION OF SPLENIC FLEXURE; RECTOSIGMOID RESECTION; COLOPROCTOSTOMY; TRANSVERSE COLECTOMY;  COLOCOLOSTOMY; ILEAL RESECTION WITH ENTEROENTEROSTOMY - Wound Class: II-Clean Contaminated   - Wound Class: II-Clean Contaminated   - Wound Class: II-Clean Contaminated   - Wound Class: II-Clean Contaminated   - Wound Class: II-Clean Contaminated    Diagnosis:CARCINOMATOSIS   Diagnosis Additional Information: No value filed.    Anesthesia Type:  General, ETT    Note:  Anesthesia Post Evaluation    Patient location during evaluation: PACU  Patient participation: Able to fully participate in evaluation  Level of consciousness: awake  Pain management: adequate  Airway patency: patent  Cardiovascular status: acceptable  Respiratory status: acceptable  Hydration status: acceptable  PONV: controlled     Anesthetic complications: None          Last vitals:  Vitals:    04/26/18 2150 04/26/18 2200 04/26/18 2232   BP: 150/72 150/87 175/87   Resp: 11 19 14   Temp:   35.4  C (95.7  F)   SpO2:   95%         Electronically Signed By: Roger Mcdaniels MD  April 26, 2018  11:18 PM

## 2018-04-27 NOTE — PROGRESS NOTES
"[facility]  Hospitalist Progress Note          Assessment and Plan:     Carcinomatosis (H):  POD #1 X-lap, omentectomy trasverse colectomy, splenectomy, colocolostomy, small bowel resection and enteroenterostomy, radical resection of pelvic tumor with TAHBSO, bilateral ureterolysis, rectosigmoid resection, coloproctostomy.  Doing well but having some pain control issues.  IV tylenol added to pain regimen.  On PCA with 0.2 mg every 10 min as dose.  Would not feed until BS very active give 3 anastamoses.  Continue with swabs and rare ice chips.  Discussed intra-op finding and procedure, anticipated length of stay of 5-7 days and further chemotherapy 4-6 wks after surgery with 2-4 more cycles depending on pathology results    Pulmonary embolism (H):  Start on lovenox 40 mg bid.  Increase to 60 bid on Saturday and 75 mg bid on .  Follow CBC    Elevated WBC:  Suspect reactive.    * No resolved hospital problems. *               Interval History:   Pain control an issue overnight.  PCA increased.  Not OOB yet              Medications:   I have reviewed this patient's current medications               Physical Exam:   Blood pressure 129/58, pulse 84, temperature 97.3  F (36.3  C), temperature source Oral, resp. rate 16, height 1.676 m (5' 6\"), weight 73 kg (160 lb 15 oz), last menstrual period 2000, SpO2 96 %, not currently breastfeeding.        Vital Sign Ranges  Temperature Temp  Av.9  F (36.1  C)  Min: 95.6  F (35.3  C)  Max: 97.8  F (36.6  C)   Blood pressure Systolic (24hrs), Av , Min:120 , Max:197        Diastolic (24hrs), Av, Min:58, Max:94      Pulse Pulse  Av  Min: 84  Max: 84   Respirations Resp  Avg: 15.5  Min: 10  Max: 22   Pulse oximetry SpO2  Av.4 %  Min: 94 %  Max: 100 %         Intake/Output Summary (Last 24 hours) at 18 0922  Last data filed at 18 0640   Gross per 24 hour   Intake             3800 ml   Output             1335 ml   Net         "     2465 ml       Lungs:   Clear     Cardiovascular:   Normal apical impulse, regular rate and rhythm, normal S1 and S2, no S3 or S4, and no murmur noted     Abdomen:   Active BS, soft, no BS, dressing intact, drain output serosanguinous in LLQ     Musculoskeletal:   no lower extremity pitting edema present                Data:   All laboratory data reviewed

## 2018-04-28 LAB
ANION GAP SERPL CALCULATED.3IONS-SCNC: 6 MMOL/L (ref 3–14)
BUN SERPL-MCNC: 10 MG/DL (ref 7–30)
CALCIUM SERPL-MCNC: 8.3 MG/DL (ref 8.5–10.1)
CHLORIDE SERPL-SCNC: 104 MMOL/L (ref 94–109)
CO2 SERPL-SCNC: 28 MMOL/L (ref 20–32)
CREAT SERPL-MCNC: 0.7 MG/DL (ref 0.52–1.04)
ERYTHROCYTE [DISTWIDTH] IN BLOOD BY AUTOMATED COUNT: 14.8 % (ref 10–15)
GFR SERPL CREATININE-BSD FRML MDRD: 82 ML/MIN/1.7M2
GLUCOSE BLDC GLUCOMTR-MCNC: 117 MG/DL (ref 70–99)
GLUCOSE SERPL-MCNC: 121 MG/DL (ref 70–99)
HCT VFR BLD AUTO: 28.5 % (ref 35–47)
HGB BLD-MCNC: 9.2 G/DL (ref 11.7–15.7)
HGB BLD-MCNC: 9.7 G/DL (ref 11.7–15.7)
MAGNESIUM SERPL-MCNC: 2.1 MG/DL (ref 1.6–2.3)
MCH RBC QN AUTO: 34.4 PG (ref 26.5–33)
MCHC RBC AUTO-ENTMCNC: 34 G/DL (ref 31.5–36.5)
MCV RBC AUTO: 101 FL (ref 78–100)
PHOSPHATE SERPL-MCNC: 1.7 MG/DL (ref 2.5–4.5)
PLATELET # BLD AUTO: 185 10E9/L (ref 150–450)
POTASSIUM SERPL-SCNC: 3.8 MMOL/L (ref 3.4–5.3)
RBC # BLD AUTO: 2.82 10E12/L (ref 3.8–5.2)
SODIUM SERPL-SCNC: 138 MMOL/L (ref 133–144)
WBC # BLD AUTO: 15.4 10E9/L (ref 4–11)

## 2018-04-28 PROCEDURE — 83735 ASSAY OF MAGNESIUM: CPT | Performed by: OBSTETRICS & GYNECOLOGY

## 2018-04-28 PROCEDURE — 25000128 H RX IP 250 OP 636: Performed by: NURSE PRACTITIONER

## 2018-04-28 PROCEDURE — A9270 NON-COVERED ITEM OR SERVICE: HCPCS | Mod: GY | Performed by: OBSTETRICS & GYNECOLOGY

## 2018-04-28 PROCEDURE — 85018 HEMOGLOBIN: CPT | Performed by: OBSTETRICS & GYNECOLOGY

## 2018-04-28 PROCEDURE — 36415 COLL VENOUS BLD VENIPUNCTURE: CPT | Performed by: OBSTETRICS & GYNECOLOGY

## 2018-04-28 PROCEDURE — 80048 BASIC METABOLIC PNL TOTAL CA: CPT | Performed by: OBSTETRICS & GYNECOLOGY

## 2018-04-28 PROCEDURE — 85027 COMPLETE CBC AUTOMATED: CPT | Performed by: OBSTETRICS & GYNECOLOGY

## 2018-04-28 PROCEDURE — 00000146 ZZHCL STATISTIC GLUCOSE BY METER IP

## 2018-04-28 PROCEDURE — 12000000 ZZH R&B MED SURG/OB

## 2018-04-28 PROCEDURE — 25800025 ZZH RX 258: Performed by: OBSTETRICS & GYNECOLOGY

## 2018-04-28 PROCEDURE — 25000128 H RX IP 250 OP 636: Performed by: OBSTETRICS & GYNECOLOGY

## 2018-04-28 PROCEDURE — 25000132 ZZH RX MED GY IP 250 OP 250 PS 637: Mod: GY | Performed by: OBSTETRICS & GYNECOLOGY

## 2018-04-28 PROCEDURE — 84100 ASSAY OF PHOSPHORUS: CPT | Performed by: OBSTETRICS & GYNECOLOGY

## 2018-04-28 RX ADMIN — POTASSIUM CHLORIDE, DEXTROSE MONOHYDRATE AND SODIUM CHLORIDE: 150; 5; 450 INJECTION, SOLUTION INTRAVENOUS at 00:54

## 2018-04-28 RX ADMIN — POTASSIUM CHLORIDE, DEXTROSE MONOHYDRATE AND SODIUM CHLORIDE: 150; 5; 450 INJECTION, SOLUTION INTRAVENOUS at 10:57

## 2018-04-28 RX ADMIN — ENOXAPARIN SODIUM 40 MG: 40 INJECTION SUBCUTANEOUS at 10:49

## 2018-04-28 RX ADMIN — Medication 15 ML: at 11:40

## 2018-04-28 RX ADMIN — ENOXAPARIN SODIUM 40 MG: 40 INJECTION SUBCUTANEOUS at 21:31

## 2018-04-28 RX ADMIN — POTASSIUM CHLORIDE, DEXTROSE MONOHYDRATE AND SODIUM CHLORIDE: 150; 5; 450 INJECTION, SOLUTION INTRAVENOUS at 20:53

## 2018-04-28 NOTE — PLAN OF CARE
Problem: Patient Care Overview  Goal: Plan of Care/Patient Progress Review  Outcome: No Change  POD#1 HUMZA/BSO. VSS on RA except hypertensive (160/53) not with parameters to notify MD. C/o of RUQ and RLQ abdominal pain (3-4/10), increases with movement, PCA Pump in place at 0.2mg q10min. NPO, PRN Biotene and mouth swabs given for dry mouth. Abdominal dressing marked with dried blood - extended outside markings, continue to monitor. BS absent. No flatus. Belching occasionally. Ambulated in hallway x2 with walker and gait belt w/ SBA. Aguilar catheter patent. L ELVIS drain bulb to suction with serosanguinous output. L PIV infusing D5 1/2NS 20KCL @ 100mL/hr. R PIV SL. Discharge pending. Continue to monitor.

## 2018-04-28 NOTE — PLAN OF CARE
Problem: Patient Care Overview  Goal: Plan of Care/Patient Progress Review  Outcome: No Change  POD#2. Mildly hypertensive, all other VSS on room air. Denies pain at rest, but increases with movement. Dilaudid PCA providing good pain control. Abdominal dressing with dried drainage. Left ELVIS with serosanguinous output. Aguilar catheter patent with good output. BS not audible. No flatus yet, but belching. NPO. PRN Biotene and mouth swabs for dry mouth. Ambulating with SBA. Will continue to monitor.

## 2018-04-28 NOTE — PROGRESS NOTES
MD Notification    Notified Person: MD    Notified Person Name: Diana Elias MD    Notification Date/Time:4/28/2018 at 1315    Notification Interaction: Talked with MD    Purpose of Notification: Hgb 9.7    Orders Received: Recheck another hemoglobinat 1530, if stable begin lovenox as ordered.      Comments:

## 2018-04-28 NOTE — PROGRESS NOTES
"GYN/ONC PROGRESS NOTE    cc: POD #2 s/p XL, omentectomy trasverse colectomy, splenectomy, colocolostomy, small bowel resection and enteroenterostomy, radical resection of pelvic tumor with HUMZA/BSO, bilateral ureterolysis, rectosigmoid resection, coloproctostomy.    S: Pain ok today with PCA. Belching. No flatus. No N/V. Ice chips sparingly. No CP/SOB/LH/dizziness. No LE pain or swelling.     O:  /59 (BP Location: Left arm)  Pulse 74  Temp 98  F (36.7  C) (Oral)  Resp 16  Ht 1.676 m (5' 6\")  Wt 74.8 kg (165 lb)  LMP 01/01/2000  SpO2 93%  BMI 26.63 kg/m2    Vitals:    04/28/18 0623 04/28/18 0714 04/28/18 0800 04/28/18 1000   BP:  131/59     BP Location:  Left arm     Pulse:  74     Resp:  16 16 16   Temp:  98  F (36.7  C)     TempSrc:  Oral     SpO2:  93%     Weight: 74.8 kg (165 lb)      Height:         Patient Vitals for the past 72 hrs:   Weight   04/28/18 0623 74.8 kg (165 lb)   04/27/18 1015 74.6 kg (164 lb 6.4 oz)   04/26/18 1242 73 kg (160 lb 15 oz)      Intake/Output Summary (Last 24 hours) at 04/28/18 1108  Last data filed at 04/28/18 1000   Gross per 24 hour   Intake             2486 ml   Output             2675 ml   Net             -189 ml     ELVIS: 375ml    GENERAL: alert, NAD  CV: RRR, no murmurs  RESPIRATORY: no dyspnea, clear bilat  ABDOMEN: non-distended, scant bowel sounds, soft, appropriately TTP, incision c/d/i with staples, bandage removed, no surrounding erythema or drainage, RLQ ELVIS drain with serosanguinous output.   EXTREMITY: no edema, neg Cinthia's  SKIN: warm and dry, no jaundice no rashes    LABS  Recent Labs   Lab Test  04/27/18   0745  04/26/18   2030  12/21/17   1504   WBC  14.6*   --   9.5   RBC  3.17*   --   4.06   HGB  10.8*  10.9*  12.0   HCT  31.6*   --   36.7   MCV  100   --   90   MCH  34.1*   --   29.6   MCHC  34.2   --   32.7   PLT  208  223  368       Recent Labs   Lab Test  04/27/18   0745  12/21/17   1504   POTASSIUM  3.9  3.8   CHLORIDE  105  100   BUN  17  20     " Recent Labs   Lab Test  04/27/18   0745  12/21/17   1504   08/14/12   1535   PROTEIN   --    --    --   10*   BILITOTAL  0.6  0.4   < >   --    AST  19  63*   < >   --    ALT  16  79*   < >   --     < > = values in this interval not displayed.       IMAGING  N/A    ASSESSMENT  Kelly Lara is a 68 year old POD #2 s/p XL, omentectomy trasverse colectomy, splenectomy, colocolostomy, small bowel resection and enteroenterostomy, radical resection of pelvic tumor with HUMZA/BSO, bilateral ureterolysis, rectosigmoid resection, coloproctostomy.      PLAN  1. Post-op  - Continue NPO with exception of swabs and rare ice chips until resumption of flatus/good BS given 3 anastamoses  - Continue IVF  - Replete lytes prn  - Continue PCA (0.2mg q10 demand)  - D/c dorado today    2. H/o PE on chronic anticoagulation  - Recheck hgb today prior to increasing lovenox dose. Vitals reassuring at this time.   - Currently on lovenox 40 mg bid. Increase to 60 bid on Saturday and 75 mg bid on Sunday.    3. Hypothyroid  - Continue levothyroxine 112mcg daily    4. Proph:  - SCDs, IS, ambulate TID    5. Dispo:  - Continue inpatient care    Diana Elias MD  Gynecologic Oncology  [facility]  [phone]

## 2018-04-28 NOTE — PROVIDER NOTIFICATION
MD Notification    Notified Person: MD    Notified Person Name: Diana Elias MD    Notification Date/Time:4/28/2018 at 1750    Notification Interaction: talked with MD    Purpose of Notification: Hemoglobin 9.2 at 1530.    Orders Received:Change Lovenox to 40mg BID, recheck labs in Am      Comments:

## 2018-04-28 NOTE — PLAN OF CARE
Problem: Patient Care Overview  Goal: Plan of Care/Patient Progress Review  Outcome: Improving  POD #2.  A&O.  VSS.  Pain well controlled with dilaudid PCA.  MD removed midline dressing, incision and staples intact.  Dressing change done to ELVIS site, 60ml serosanguinous output.  Aguilar removed at 1130, pt due to void.  No bowel sounds noted, pt is belching but not passing flatus yet.  NPO.  Biotene given for dry mouth.  Ambulated several times in the halls, sat up in chair for majority of shift.  Plan to recheck hemoglobin at 1530 and if stable Lovenox dose will be increased.  Cont to monitor.

## 2018-04-29 LAB
ALBUMIN SERPL-MCNC: 2.7 G/DL (ref 3.4–5)
ALP SERPL-CCNC: 60 U/L (ref 40–150)
ALT SERPL W P-5'-P-CCNC: 11 U/L (ref 0–50)
ANION GAP SERPL CALCULATED.3IONS-SCNC: 6 MMOL/L (ref 3–14)
AST SERPL W P-5'-P-CCNC: 20 U/L (ref 0–45)
BILIRUB SERPL-MCNC: 0.6 MG/DL (ref 0.2–1.3)
BUN SERPL-MCNC: 6 MG/DL (ref 7–30)
CALCIUM SERPL-MCNC: 8.7 MG/DL (ref 8.5–10.1)
CHLORIDE SERPL-SCNC: 104 MMOL/L (ref 94–109)
CO2 SERPL-SCNC: 30 MMOL/L (ref 20–32)
CREAT SERPL-MCNC: 0.66 MG/DL (ref 0.52–1.04)
ERYTHROCYTE [DISTWIDTH] IN BLOOD BY AUTOMATED COUNT: 14.3 % (ref 10–15)
GFR SERPL CREATININE-BSD FRML MDRD: 89 ML/MIN/1.7M2
GLUCOSE SERPL-MCNC: 111 MG/DL (ref 70–99)
HCT VFR BLD AUTO: 29.5 % (ref 35–47)
HGB BLD-MCNC: 9.9 G/DL (ref 11.7–15.7)
MAGNESIUM SERPL-MCNC: 2.1 MG/DL (ref 1.6–2.3)
MCH RBC QN AUTO: 33.8 PG (ref 26.5–33)
MCHC RBC AUTO-ENTMCNC: 33.6 G/DL (ref 31.5–36.5)
MCV RBC AUTO: 101 FL (ref 78–100)
PHOSPHATE SERPL-MCNC: 2.2 MG/DL (ref 2.5–4.5)
PLATELET # BLD AUTO: 220 10E9/L (ref 150–450)
POTASSIUM SERPL-SCNC: 3.6 MMOL/L (ref 3.4–5.3)
PROT SERPL-MCNC: 6.8 G/DL (ref 6.8–8.8)
RBC # BLD AUTO: 2.93 10E12/L (ref 3.8–5.2)
SODIUM SERPL-SCNC: 140 MMOL/L (ref 133–144)
WBC # BLD AUTO: 13 10E9/L (ref 4–11)

## 2018-04-29 PROCEDURE — 83735 ASSAY OF MAGNESIUM: CPT | Performed by: OBSTETRICS & GYNECOLOGY

## 2018-04-29 PROCEDURE — 84100 ASSAY OF PHOSPHORUS: CPT | Performed by: OBSTETRICS & GYNECOLOGY

## 2018-04-29 PROCEDURE — 25000128 H RX IP 250 OP 636: Performed by: OBSTETRICS & GYNECOLOGY

## 2018-04-29 PROCEDURE — 12000000 ZZH R&B MED SURG/OB

## 2018-04-29 PROCEDURE — 36415 COLL VENOUS BLD VENIPUNCTURE: CPT | Performed by: OBSTETRICS & GYNECOLOGY

## 2018-04-29 PROCEDURE — 85027 COMPLETE CBC AUTOMATED: CPT | Performed by: OBSTETRICS & GYNECOLOGY

## 2018-04-29 PROCEDURE — 25000125 ZZHC RX 250: Performed by: OBSTETRICS & GYNECOLOGY

## 2018-04-29 PROCEDURE — 85049 AUTOMATED PLATELET COUNT: CPT | Performed by: OBSTETRICS & GYNECOLOGY

## 2018-04-29 PROCEDURE — 80053 COMPREHEN METABOLIC PANEL: CPT | Performed by: OBSTETRICS & GYNECOLOGY

## 2018-04-29 RX ADMIN — ENOXAPARIN SODIUM 60 MG: 60 INJECTION SUBCUTANEOUS at 10:53

## 2018-04-29 RX ADMIN — POTASSIUM PHOSPHATE, MONOBASIC AND POTASSIUM PHOSPHATE, DIBASIC 15 MMOL: 224; 236 INJECTION, SOLUTION INTRAVENOUS at 13:48

## 2018-04-29 NOTE — PLAN OF CARE
Problem: Patient Care Overview  Goal: Plan of Care/Patient Progress Review  Outcome: Improving  POD#3. VSS on room air. Dilaudid PCA providing good pain control, using sparingly. Abdominal incision CDI with staples, covered loosely with ABD pads. ELVIS with serosanguinous output, dressing CDI. BS not audible. Still no flatus yet. Remains NPO. Voiding well. Ambulating with SBA. Will continue to monitor.

## 2018-04-29 NOTE — PROGRESS NOTES
"GYN/ONC PROGRESS NOTE    cc: POD #3 s/p XL, omentectomy trasverse colectomy, splenectomy, colocolostomy, small bowel resection and enteroenterostomy, radical resection of pelvic tumor with HUMZA/BSO, bilateral ureterolysis, rectosigmoid resection, coloproctostomy.    S: Pain ok today with PCA. Still no flatus but having bowel sounds now. No N/V. No CP/SOB/LH/dizziness. No LE pain or swelling. Voiding on her own. Frequent voiding overnight.     O:  /57 (BP Location: Right arm)  Pulse 77  Temp 98.7  F (37.1  C) (Oral)  Resp 16  Ht 1.676 m (5' 6\")  Wt 72.8 kg (160 lb 9.6 oz)  LMP 01/01/2000  SpO2 95%  BMI 25.92 kg/m2    Vitals:    04/28/18 2000 04/28/18 2353 04/29/18 0422 04/29/18 0732   BP:  148/67  139/57   BP Location:  Left arm  Right arm   Pulse:    77   Resp: 16 16  16   Temp:  99.5  F (37.5  C)  98.7  F (37.1  C)   TempSrc:  Oral  Oral   SpO2:  93%  95%   Weight:   72.8 kg (160 lb 9.6 oz)    Height:         Patient Vitals for the past 72 hrs:   Weight   04/29/18 0422 72.8 kg (160 lb 9.6 oz)   04/28/18 0623 74.8 kg (165 lb)   04/27/18 1015 74.6 kg (164 lb 6.4 oz)   04/26/18 1242 73 kg (160 lb 15 oz)      Intake/Output Summary (Last 24 hours) at 04/28/18 1108  Last data filed at 04/28/18 1000   Gross per 24 hour   Intake             2486 ml   Output             2675 ml   Net             -189 ml     ELVIS: 140ml    GENERAL: alert, NAD  CV: RRR, no murmurs  RESPIRATORY: no dyspnea, clear bilat  ABDOMEN: mildly distended and tympanitic, +BS, soft, appropriately TTP, incision c/d/i with staples, no surrounding erythema or drainage, RLQ ELVIS drain with serosanguinous output.   EXTREMITY: trace edema b/l, neg Cinthia's  SKIN: warm and dry, no jaundice no rashes    LABS  Recent Labs   Lab Test  04/29/18   0735  04/28/18   1505  04/28/18   1132   WBC  13.0*   --   15.4*   RBC  2.93*   --   2.82*   HGB  9.9*  9.2*  9.7*   HCT  29.5*   --   28.5*   MCV  101*   --   101*   MCH  33.8*   --   34.4*   MCHC  33.6   --   " 34.0   PLT  220   --   185       Recent Labs   Lab Test  04/29/18   0735  04/28/18   1132   POTASSIUM  3.6  3.8   CHLORIDE  104  104   BUN  6*  10       Recent Labs   Lab Test  04/29/18   0735  04/27/18   0745   08/14/12   1535   PROTEIN   --    --    --   10*   BILITOTAL  0.6  0.6   < >   --    AST  20  19   < >   --    ALT  11  16   < >   --     < > = values in this interval not displayed.       IMAGING  N/A    ASSESSMENT  Kelly Lara is a 68 year old POD #3 s/p XL, omentectomy trasverse colectomy, splenectomy, colocolostomy, small bowel resection and enteroenterostomy, radical resection of pelvic tumor with HUMZA/BSO, bilateral ureterolysis, rectosigmoid resection, coloproctostomy.  Doing well.    PLAN  1. Post-op  - Will advance to clears given good BS. Pt has 3 anastamoses, will advance slowly. Total PO intake limited to 1L today.   - Continue IVF but decrease to 75cc/hr  - Replete lytes prn  - Continue PCA (0.2mg q10 demand). Transition to oral meds tomorrow.   - Aguilar removed. Voiding normally.    2. H/o PE on chronic anticoagulation  - Hgb stable on rechecks  - Currently on lovenox 40 mg bid. Left at 40mg BID yesterday given 1g hgb drop. Hgb now stable. Increase to 60 bid today and 75 mg bid on Monday.     3. Hypothyroid  - Continue levothyroxine 112mcg daily    4. Proph:  - SCDs, IS, ambulate TID    5. Dispo:  - Continue inpatient care    Diana Elias MD  Gynecologic Oncology  [facility]  [phone]

## 2018-04-29 NOTE — PLAN OF CARE
Problem: Patient Care Overview  Goal: Plan of Care/Patient Progress Review  Outcome: Improving  POD #3.  A&O.  VSS.  Not using PCA, denies pain.  Diet advanced to clears and patient is tolerating well.  Hypoactive bowel sounds, no flatus.  Phosphorous replaced per protocol.  IVF rate decreased to 75ml/hr.  Ambulating frequently independently.  ELVIS patent with serosanginous ouput.  Midline staples SIMON.  Voiding adequately.  Cont to monitor.

## 2018-04-30 LAB
COPATH REPORT: NORMAL
HGB BLD-MCNC: 10.7 G/DL (ref 11.7–15.7)
PHOSPHATE SERPL-MCNC: 3.2 MG/DL (ref 2.5–4.5)

## 2018-04-30 PROCEDURE — 84100 ASSAY OF PHOSPHORUS: CPT | Performed by: OBSTETRICS & GYNECOLOGY

## 2018-04-30 PROCEDURE — 12000000 ZZH R&B MED SURG/OB

## 2018-04-30 PROCEDURE — 25000128 H RX IP 250 OP 636: Performed by: PHYSICIAN ASSISTANT

## 2018-04-30 PROCEDURE — 25000132 ZZH RX MED GY IP 250 OP 250 PS 637: Mod: GY | Performed by: OBSTETRICS & GYNECOLOGY

## 2018-04-30 PROCEDURE — 25800025 ZZH RX 258: Performed by: OBSTETRICS & GYNECOLOGY

## 2018-04-30 PROCEDURE — 85018 HEMOGLOBIN: CPT | Performed by: PHYSICIAN ASSISTANT

## 2018-04-30 PROCEDURE — A9270 NON-COVERED ITEM OR SERVICE: HCPCS | Mod: GY | Performed by: OBSTETRICS & GYNECOLOGY

## 2018-04-30 PROCEDURE — 25000128 H RX IP 250 OP 636: Performed by: OBSTETRICS & GYNECOLOGY

## 2018-04-30 PROCEDURE — 36415 COLL VENOUS BLD VENIPUNCTURE: CPT | Performed by: OBSTETRICS & GYNECOLOGY

## 2018-04-30 RX ORDER — HYDROMORPHONE HYDROCHLORIDE 1 MG/ML
0.5 INJECTION, SOLUTION INTRAMUSCULAR; INTRAVENOUS; SUBCUTANEOUS
Status: DISCONTINUED | OUTPATIENT
Start: 2018-04-30 | End: 2018-05-01 | Stop reason: HOSPADM

## 2018-04-30 RX ORDER — ACETAMINOPHEN 325 MG/1
650 TABLET ORAL EVERY 6 HOURS PRN
Status: DISCONTINUED | OUTPATIENT
Start: 2018-04-30 | End: 2018-05-01 | Stop reason: HOSPADM

## 2018-04-30 RX ORDER — LOPERAMIDE HCL 2 MG
2 CAPSULE ORAL 4 TIMES DAILY PRN
Status: DISCONTINUED | OUTPATIENT
Start: 2018-04-30 | End: 2018-05-01 | Stop reason: HOSPADM

## 2018-04-30 RX ADMIN — POTASSIUM CHLORIDE, DEXTROSE MONOHYDRATE AND SODIUM CHLORIDE: 150; 5; 450 INJECTION, SOLUTION INTRAVENOUS at 01:35

## 2018-04-30 RX ADMIN — ENOXAPARIN SODIUM 60 MG: 60 INJECTION SUBCUTANEOUS at 00:30

## 2018-04-30 RX ADMIN — LEVOTHYROXINE SODIUM 112 MCG: 112 TABLET ORAL at 08:11

## 2018-04-30 RX ADMIN — ENOXAPARIN SODIUM 70 MG: 80 INJECTION SUBCUTANEOUS at 09:24

## 2018-04-30 RX ADMIN — LOPERAMIDE HYDROCHLORIDE 2 MG: 2 CAPSULE ORAL at 20:59

## 2018-04-30 RX ADMIN — LOPERAMIDE HYDROCHLORIDE 2 MG: 2 CAPSULE ORAL at 14:52

## 2018-04-30 RX ADMIN — ENOXAPARIN SODIUM 70 MG: 80 INJECTION SUBCUTANEOUS at 20:59

## 2018-04-30 NOTE — PROGRESS NOTES
Pt has had 2 episodes of loose stools during shift, first one @ 2140 which had two dime size smears of blood in it and was approx 200mL .  Second one was @ 0500 and smaller in volume approx 100mL.

## 2018-04-30 NOTE — PLAN OF CARE
Problem: Patient Care Overview  Goal: Plan of Care/Patient Progress Review  Outcome: Improving  Loose BMs today. tolerating clear liquids well. Ambulating independently in crespo. Minimal pain, has not needed to use PCA

## 2018-04-30 NOTE — PLAN OF CARE
Problem: Patient Care Overview  Goal: Plan of Care/Patient Progress Review  Outcome: Improving  Pt is A/Ox4.  VSS on RA,  Tolerating clear diet.  Hypoactive BS, +flatus, 2 loose BM during shift one w/ 2 dime size blood smears, voiding adequately. IVF infusing 75ml/hr.  Up independently.  ELVIS patent with serosanginous ouput 1 lg yellowish clot in bulb.  Midline staples SIMON. D/C pending.

## 2018-04-30 NOTE — PROGRESS NOTES
"GYN/ONC PROGRESS NOTE    cc: POD #4 s/p XL, omentectomy trasverse colectomy, splenectomy, colocolostomy, small bowel resection and enteroenterostomy, radical resection of pelvic tumor with HUMZA/BSO, bilateral ureterolysis, rectosigmoid resection, coloproctostomy.    S: pain very well controlled. Started to have loose stools over night. No nausea. Elvis drain still serosang. No fevers.     O:  /73 (BP Location: Left arm)  Pulse 77  Temp 97  F (36.1  C) (Oral)  Resp 16  Ht 1.676 m (5' 6\")  Wt 72.8 kg (160 lb 9.6 oz)  LMP 01/01/2000  SpO2 93%  BMI 25.92 kg/m2    Vitals:    04/29/18 1800 04/29/18 2041 04/30/18 0814 04/30/18 0828   BP:  138/72  128/73   BP Location:  Left arm  Left arm   Pulse:       Resp: 16 16 16 16   Temp:  98.8  F (37.1  C)  97  F (36.1  C)   TempSrc:  Oral  Oral   SpO2:    93%   Weight:       Height:         Patient Vitals for the past 72 hrs:   Weight   04/29/18 0422 72.8 kg (160 lb 9.6 oz)   04/28/18 0623 74.8 kg (165 lb)   04/27/18 1015 74.6 kg (164 lb 6.4 oz)        Intake/Output Summary (Last 24 hours) at 04/30/18 0838  Last data filed at 04/30/18 0800   Gross per 24 hour   Intake             1730 ml   Output             1140 ml   Net              590 ml      ELVIS: 147ml/8 hrs     GENERAL: alert, NAD  CV: RRR, no murmurs  RESPIRATORY: no dyspnea, clear bilat, diminshed at the bases.   ABDOMEN: mildly distended and tympanitic, +BS, soft, appropriately TTP, incision c/d/i with staples, no surrounding erythema or drainage, RLQ ELVIS drain with serosanguinous output.   EXTREMITY: trace edema b/l, neg Cinthia's  SKIN: warm and dry, no jaundice no rashes    LABS  Recent Labs   Lab Test  04/30/18   0751  04/29/18   0735   04/28/18   1132   WBC   --   13.0*   --   15.4*   RBC   --   2.93*   --   2.82*   HGB  10.7*  9.9*   < >  9.7*   HCT   --   29.5*   --   28.5*   MCV   --   101*   --   101*   MCH   --   33.8*   --   34.4*   MCHC   --   33.6   --   34.0   PLT   --   220   --   185    < > = values " in this interval not displayed.       Recent Labs   Lab Test  04/29/18   0735  04/28/18   1132   POTASSIUM  3.6  3.8   CHLORIDE  104  104   BUN  6*  10       Recent Labs   Lab Test  04/29/18   0735  04/27/18   0745   08/14/12   1535   PROTEIN   --    --    --   10*   BILITOTAL  0.6  0.6   < >   --    AST  20  19   < >   --    ALT  11  16   < >   --     < > = values in this interval not displayed.       IMAGING  N/A    ASSESSMENT  Kelly Lara is a 68 year old POD #4 s/p XL, omentectomy trasverse colectomy, splenectomy, colocolostomy, small bowel resection and enteroenterostomy, radical resection of pelvic tumor with HUMZA/BSO, bilateral ureterolysis, rectosigmoid resection, coloproctostomy.  Doing well. Pathology pending.   PLAN  1. Post-op  - Ok to advance to full liquids per Dr. Escalera.    - Continue IVF at 75cc/hr and will decrease if tolerating PO.   - Replete lytes prn  - Continue PCA (0.2mg q10 demand). Transition to oral meds later today if tolerating PO.   - Aguilar removed. Voiding normally.    2. H/o PE on chronic anticoagulation  - Hgb stable on rechecks  - Increase to 70 mg lovenox BID today.     3. Hypothyroid  - Continue levothyroxine 112mcg daily    4. Proph:  - SCDs, IS, ambulate TID    5. Dispo:  - Continue inpatient care    Jaqui Downs PA-C   [phone], after 5 pm

## 2018-05-01 VITALS
HEART RATE: 77 BPM | HEIGHT: 66 IN | WEIGHT: 156.4 LBS | SYSTOLIC BLOOD PRESSURE: 133 MMHG | RESPIRATION RATE: 16 BRPM | OXYGEN SATURATION: 94 % | DIASTOLIC BLOOD PRESSURE: 62 MMHG | TEMPERATURE: 98 F | BODY MASS INDEX: 25.13 KG/M2

## 2018-05-01 LAB
ANION GAP SERPL CALCULATED.3IONS-SCNC: 7 MMOL/L (ref 3–14)
BASOPHILS # BLD AUTO: 0 10E9/L (ref 0–0.2)
BASOPHILS NFR BLD AUTO: 0.2 %
BUN SERPL-MCNC: 13 MG/DL (ref 7–30)
CALCIUM SERPL-MCNC: 8.6 MG/DL (ref 8.5–10.1)
CHLORIDE SERPL-SCNC: 101 MMOL/L (ref 94–109)
CO2 SERPL-SCNC: 29 MMOL/L (ref 20–32)
CREAT SERPL-MCNC: 0.74 MG/DL (ref 0.52–1.04)
DIFFERENTIAL METHOD BLD: ABNORMAL
EOSINOPHIL # BLD AUTO: 0.2 10E9/L (ref 0–0.7)
EOSINOPHIL NFR BLD AUTO: 2.5 %
ERYTHROCYTE [DISTWIDTH] IN BLOOD BY AUTOMATED COUNT: 13.9 % (ref 10–15)
GFR SERPL CREATININE-BSD FRML MDRD: 77 ML/MIN/1.7M2
GLUCOSE SERPL-MCNC: 100 MG/DL (ref 70–99)
HCT VFR BLD AUTO: 29.4 % (ref 35–47)
HGB BLD-MCNC: 9.9 G/DL (ref 11.7–15.7)
IMM GRANULOCYTES # BLD: 0 10E9/L (ref 0–0.4)
IMM GRANULOCYTES NFR BLD: 0.2 %
LYMPHOCYTES # BLD AUTO: 1.5 10E9/L (ref 0.8–5.3)
LYMPHOCYTES NFR BLD AUTO: 15.9 %
MCH RBC QN AUTO: 33.6 PG (ref 26.5–33)
MCHC RBC AUTO-ENTMCNC: 33.7 G/DL (ref 31.5–36.5)
MCV RBC AUTO: 100 FL (ref 78–100)
MONOCYTES # BLD AUTO: 1.1 10E9/L (ref 0–1.3)
MONOCYTES NFR BLD AUTO: 11.6 %
NEUTROPHILS # BLD AUTO: 6.3 10E9/L (ref 1.6–8.3)
NEUTROPHILS NFR BLD AUTO: 69.6 %
NRBC # BLD AUTO: 0 10*3/UL
NRBC BLD AUTO-RTO: 0 /100
PLATELET # BLD AUTO: 308 10E9/L (ref 150–450)
POTASSIUM SERPL-SCNC: 3.8 MMOL/L (ref 3.4–5.3)
RBC # BLD AUTO: 2.95 10E12/L (ref 3.8–5.2)
SODIUM SERPL-SCNC: 137 MMOL/L (ref 133–144)
WBC # BLD AUTO: 9.1 10E9/L (ref 4–11)

## 2018-05-01 PROCEDURE — A9270 NON-COVERED ITEM OR SERVICE: HCPCS | Mod: GY | Performed by: OBSTETRICS & GYNECOLOGY

## 2018-05-01 PROCEDURE — 85025 COMPLETE CBC W/AUTO DIFF WBC: CPT | Performed by: PHYSICIAN ASSISTANT

## 2018-05-01 PROCEDURE — 25000132 ZZH RX MED GY IP 250 OP 250 PS 637: Mod: GY | Performed by: OBSTETRICS & GYNECOLOGY

## 2018-05-01 PROCEDURE — 25000128 H RX IP 250 OP 636: Performed by: PHYSICIAN ASSISTANT

## 2018-05-01 PROCEDURE — 36415 COLL VENOUS BLD VENIPUNCTURE: CPT | Performed by: PHYSICIAN ASSISTANT

## 2018-05-01 PROCEDURE — 80048 BASIC METABOLIC PNL TOTAL CA: CPT | Performed by: PHYSICIAN ASSISTANT

## 2018-05-01 RX ADMIN — ENOXAPARIN SODIUM 70 MG: 80 INJECTION SUBCUTANEOUS at 09:20

## 2018-05-01 RX ADMIN — LEVOTHYROXINE SODIUM 112 MCG: 112 TABLET ORAL at 09:22

## 2018-05-01 NOTE — PLAN OF CARE
Problem: Patient Care Overview  Goal: Plan of Care/Patient Progress Review  Outcome: Improving  A/O x4. VSS. Satting in mid 90's on RA. IS use and ambulation encouraged. Denies pain. PCA dilaudid and IVF d/c in AM shift. Advanced to full liquid diet, tolerating well. Denies nausea/emesis. BS active, passing flatus, had 2 loose BMs this shift, per pt report diarrhea is improving. PRN imodium given x1. Midline incision staples SIMON with abdominal binder. ELVIS drain patent with serosanguinous output and 1 large yellow/pink tissue in bulb. Voiding adequately in BR. Ambulating independently in room and to BR. Will continue to monitor.

## 2018-05-01 NOTE — PLAN OF CARE
Problem: Patient Care Overview  Goal: Plan of Care/Patient Progress Review  Outcome: No Change  Patient is POD5 of HUMZA-BSO/splenectomy/omentectomy. Patient is A&Ox4. VSS. Denies pain overnight. Midline incision SIMON with no drainage. ELVIS dressing CDI with serosanguinous output. Tolerating full liquid diet. Denies nausea but c/o diarrhea x2 overnight. R PIV SL. Up independently.

## 2018-05-01 NOTE — PROGRESS NOTES
"[facility]  Hospitalist Progress Note          Assessment and Plan:     Carcinomatosis (H):  POD #5 XL, omentectomy, transverse colectomy, splenectomy, colocolostomy, enterectomy with enteroenterostomy, radical resection of pelvic tumor with TAHBSO, bilateral ureterolysis, rectosigmoid resection, coloproctostomy.  Has had return of bowel function and more controlled today with assistance of immodium.  Will advance diet to regular.  Will remove pelvic drain.  Home later today or tomorrow AM at the latest.    Pulmonary embolism (H):  On therapeutic lovenox.  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                 Interval History:   pain is controlled, doing well; no cp, sob, n/v/d              Medications:   I have reviewed this patient's current medications               Physical Exam:   Blood pressure 138/65, pulse 77, temperature 98.1  F (36.7  C), temperature source Oral, resp. rate 16, height 1.676 m (5' 6\"), weight 70.9 kg (156 lb 6.4 oz), last menstrual period 2000, SpO2 93 %, not currently breastfeeding.        Vital Sign Ranges  Temperature Temp  Av.7  F (36.5  C)  Min: 96.7  F (35.9  C)  Max: 98.2  F (36.8  C)   Blood pressure Systolic (24hrs), Av , Min:125 , Max:138        Diastolic (24hrs), Av, Min:65, Max:77      Pulse No Data Recorded   Respirations Resp  Av  Min: 16  Max: 16   Pulse oximetry SpO2  Av.2 %  Min: 93 %  Max: 95 %         Intake/Output Summary (Last 24 hours) at 18 0931  Last data filed at 18 0836   Gross per 24 hour   Intake             1076 ml   Output              895 ml   Net              181 ml       Lungs:   Clear     Cardiovascular:   " Normal apical impulse, regular rate and rhythm, normal S1 and S2, no S3 or S4, and no murmur noted     Abdomen:   Active BS, soft, incision fine, non-distended, drain output serosanguinous                Data:   All laboratory data reviewed

## 2018-05-01 NOTE — DISCHARGE INSTRUCTIONS
Dr. Escalera   [facility]  [address] [facility] [address]  [facility] [address] [phone]

## 2018-05-01 NOTE — PROGRESS NOTES
Tolerated regular diet well today. Minimal discomfort, declined pain meds. Ambulating independently. Diarrhea resolving, voiding well. Prescriptions filled and given to patient, reviewed lovenox dosing, how to waste for correct dose. Patient has given herself the injections before and feels comfortable with it. Belongings with patient for discharge, dressing supplies provided. Discharged with family via w/c escorted by volunteer

## 2018-05-01 NOTE — PROGRESS NOTES
SPIRITUAL [facility] SERVICES Progress Note  [facility]    Met with pt and her daughter, per pt's length of stay.  Pt said that she's planning to dc home today, which is a couple of days earlier than she'd anticipated.  She is looking forward to sleeping in her own bed and sitting on the back deck in the sun.  I will not follow but SH team is always available if pt's stay is extended or if needs arise.                                                                                                                                               Akanksha Dejesus M.A.  Staff   Pager [phone]  Phone [phone]

## 2018-05-02 ENCOUNTER — CARE COORDINATION (OUTPATIENT)
Dept: FAMILY MEDICINE | Facility: CLINIC | Age: 68
End: 2018-05-02

## 2018-05-02 NOTE — PROGRESS NOTES
Care Coordination Assessment    PCP: Tabitha Bruno    Referral Source:  ED/IP List    Clinical Data: Patient discharged from inpatient [facility] Carcinomatosis Surgery.  Patient discharged home with instructions to follow up with surgeon in 2 weeks      Plan: I will forward to Grecia in clinical support to assess and assist with appropriate follow up

## 2018-05-04 NOTE — PROGRESS NOTES
I contacted the patient to see how she is doing after her surgery. She states she is doing really well other than some soreness/tenderness in her abdomen. I informed that it is to be expected after all the work she had done. I advised that she wear her binder when she is up and moving and leave it off when she's resting like the surgeon told her.    I asked that she contact us if she has anything she needs from us.    Grecia Angel, CMA

## 2018-07-01 DIAGNOSIS — E03.9 ACQUIRED HYPOTHYROIDISM: ICD-10-CM

## 2018-07-02 RX ORDER — LEVOTHYROXINE SODIUM 112 MCG
TABLET ORAL
Qty: 30 TABLET | Refills: 0 | OUTPATIENT
Start: 2018-07-02

## 2018-07-02 NOTE — TELEPHONE ENCOUNTER
Pending Prescriptions:                       Disp   Refills    SYNTHROID 112 MCG tablet [Pharmacy Med Na*30 tab*0            Sig: TAKE 1 TABLET BY MOUTH ONCE DAILY    Pt has a new provider in chart.   Pt is due for a yearly ov and blood work.  Fax or deny.   Erlinda  [phone] (home)

## 2022-02-11 DIAGNOSIS — Z01.818 EXAMINATION PRIOR TO CHEMOTHERAPY: ICD-10-CM

## 2022-02-11 DIAGNOSIS — C57.01 MALIGNANT NEOPLASM OF BOTH FALLOPIAN TUBES (H): Primary | ICD-10-CM

## 2022-02-11 DIAGNOSIS — C57.02 MALIGNANT NEOPLASM OF BOTH FALLOPIAN TUBES (H): Primary | ICD-10-CM

## 2022-05-09 ENCOUNTER — HOSPITAL ENCOUNTER (OUTPATIENT)
Dept: ULTRASOUND IMAGING | Facility: CLINIC | Age: 72
Discharge: HOME | End: 2022-05-09
Attending: NURSE PRACTITIONER | Admitting: NURSE PRACTITIONER
Payer: COMMERCIAL

## 2022-05-09 DIAGNOSIS — O22.30 DVT (DEEP VEIN THROMBOSIS) IN PREGNANCY: ICD-10-CM

## 2022-05-09 DIAGNOSIS — R60.9 EDEMA: ICD-10-CM

## 2022-05-09 PROCEDURE — 93971 EXTREMITY STUDY: CPT | Mod: RT

## 2022-06-21 ENCOUNTER — MEDICAL CORRESPONDENCE (OUTPATIENT)
Dept: HEALTH INFORMATION MANAGEMENT | Facility: CLINIC | Age: 72
End: 2022-06-21

## 2022-06-21 ENCOUNTER — TRANSFERRED RECORDS (OUTPATIENT)
Dept: HEALTH INFORMATION MANAGEMENT | Facility: CLINIC | Age: 72
End: 2022-06-21

## 2022-06-23 ENCOUNTER — HOSPITAL ENCOUNTER (OUTPATIENT)
Facility: CLINIC | Age: 72
Discharge: HOME | End: 2022-06-23
Admitting: NURSE PRACTITIONER
Payer: COMMERCIAL

## 2022-06-23 VITALS
HEART RATE: 62 BPM | SYSTOLIC BLOOD PRESSURE: 146 MMHG | DIASTOLIC BLOOD PRESSURE: 75 MMHG | RESPIRATION RATE: 16 BRPM | TEMPERATURE: 97.8 F | OXYGEN SATURATION: 98 %

## 2022-06-23 LAB
ABO/RH(D): NORMAL
ANTIBODY SCREEN: NEGATIVE
BLD PROD TYP BPU: NORMAL
BLD PROD TYP BPU: NORMAL
BLOOD COMPONENT TYPE: NORMAL
BLOOD COMPONENT TYPE: NORMAL
CODING SYSTEM: NORMAL
CODING SYSTEM: NORMAL
CROSSMATCH: NORMAL
CROSSMATCH: NORMAL
ISSUE DATE AND TIME: NORMAL
ISSUE DATE AND TIME: NORMAL
SPECIMEN EXPIRATION DATE: NORMAL
UNIT ABO/RH: NORMAL
UNIT ABO/RH: NORMAL
UNIT NUMBER: NORMAL
UNIT NUMBER: NORMAL
UNIT STATUS: NORMAL
UNIT STATUS: NORMAL
UNIT TYPE ISBT: 600
UNIT TYPE ISBT: 600

## 2022-06-23 PROCEDURE — 250N000011 HC RX IP 250 OP 636: Performed by: PHYSICIAN ASSISTANT

## 2022-06-23 PROCEDURE — 96374 THER/PROPH/DIAG INJ IV PUSH: CPT

## 2022-06-23 PROCEDURE — 86850 RBC ANTIBODY SCREEN: CPT | Performed by: NURSE PRACTITIONER

## 2022-06-23 PROCEDURE — 36430 TRANSFUSION BLD/BLD COMPNT: CPT

## 2022-06-23 PROCEDURE — 999N000087 HC STATISTIC IV OP-OVERFLOW

## 2022-06-23 PROCEDURE — P9016 RBC LEUKOCYTES REDUCED: HCPCS

## 2022-06-23 PROCEDURE — 36591 DRAW BLOOD OFF VENOUS DEVICE: CPT | Performed by: NURSE PRACTITIONER

## 2022-06-23 PROCEDURE — 86923 COMPATIBILITY TEST ELECTRIC: CPT

## 2022-06-23 RX ORDER — LEVOTHYROXINE SODIUM 88 UG/1
88 TABLET ORAL DAILY
COMMUNITY

## 2022-06-23 RX ORDER — HEPARIN SODIUM (PORCINE) LOCK FLUSH IV SOLN 100 UNIT/ML 100 UNIT/ML
5-10 SOLUTION INTRAVENOUS
Status: DISCONTINUED | OUTPATIENT
Start: 2022-06-23 | End: 2022-06-23 | Stop reason: HOSPADM

## 2022-06-23 RX ADMIN — Medication 5 ML: at 18:02

## 2022-06-23 NOTE — PROGRESS NOTES
PATIENT/VISITOR WELLNESS SCREENING    Step 1 Patient Screening    1. In the last month, have you been in contact with someone who was confirmed or suspected to have Coronavirus/COVID-19? No      2. Do you have the following symptoms?  Fever/Chills? No   Cough? No   Shortness of breath? No   New loss of taste or smell? No  Sore throat? No  Muscle or body aches? No  Headaches? No  Fatigue? No  Vomiting or diarrhea? No      Step 3 Refer to logic grid below for actions    NO SYMPTOM(S)    ACTIONS:  1. Standard rooming process  2. Provider to assess per normal protocol  3. Implement precautions as needed and per guidelines     POSITIVE SYMPTOM(S)  If positive for ANY of the following symptoms: fever, cough, shortness of breath, rash    ACTION:  1. Continue to have the patient wear a mask   2. Room patient as soon as possible  3. Don appropriate PPE when entering room  4. Provider evaluation

## 2022-06-23 NOTE — PROGRESS NOTES
Pt had signed consent.   Was spoken to about transfusion by [facility] earlier this week and consent signed by MD.  Pt stated she understands she is getting transfusion and risks and benefits that were explained earlier this week by MD

## 2022-06-23 NOTE — PROGRESS NOTES
1520 Report received from Raul Mcwilliams RN.  1545 1st unit of blood completed.  1605 2nd unit of blood initiated.  1745 2nd unit of blood completed.   1800 Port heparinized & deaccessed. No transfusion reaction.  1805 Pt discharged per ambulatory to private vehicle. All personal belongings taken with pt.

## 2022-06-23 NOTE — PROGRESS NOTES
Care Suites Admission Nursing Note    Patient Information  Name: Kelly Lara  Age: 72 year old  Reason for admission: Blood Tx  Care Suites arrival time: 1200      Waiting rooms closed to visitors    Patient Admission/Assessment   Pre-procedure assessment complete: Yes  If abnormal assessment/labs, provider notified: N/A  NPO: No  Medications held per instructions/orders: N/A  Consent: obtained  If applicable, pregnancy test status: obtained  Patient oriented to room: Yes  Education/questions answered: Yes  Plan/other: Prep for transfusion        Raul Mcwilliams RN

## 2024-04-11 ENCOUNTER — HOSPITAL ENCOUNTER (OUTPATIENT)
Dept: ULTRASOUND IMAGING | Facility: CLINIC | Age: 74
Discharge: HOME | End: 2024-04-11
Attending: NURSE PRACTITIONER | Admitting: NURSE PRACTITIONER
Payer: COMMERCIAL

## 2024-04-11 DIAGNOSIS — Z86.718 PERSONAL HISTORY OF DVT (DEEP VEIN THROMBOSIS): ICD-10-CM

## 2024-04-11 DIAGNOSIS — R60.0 LEG EDEMA, LEFT: ICD-10-CM

## 2024-04-11 DIAGNOSIS — C57.01 MALIGNANT NEOPLASM OF RIGHT FALLOPIAN TUBE (H): ICD-10-CM

## 2024-04-11 PROCEDURE — 93971 EXTREMITY STUDY: CPT | Mod: LT

## (undated) DEVICE — GLOVE PROTEXIS W/NEU-THERA 6.5  2D73TE65

## (undated) DEVICE — DRAPE SHEET REV FOLD 3/4 9349

## (undated) DEVICE — TUBING SUCTION 12"X1/4" N612

## (undated) DEVICE — SU PDS II 1 TP-1 48" Z880G

## (undated) DEVICE — ESU GROUND PAD UNIVERSAL W/O CORD

## (undated) DEVICE — SYR 50ML CATH TIP W/O NDL 309620

## (undated) DEVICE — SU VICRYL 3-0 SH 27" J316H

## (undated) DEVICE — GLOVE PROTEXIS MICRO 6.0  2D73PM60

## (undated) DEVICE — GLOVE PROTEXIS MICRO 8.0  2D73PM80

## (undated) DEVICE — STPL LINEAR 60 X 3.5MM TA6035S

## (undated) DEVICE — DRAPE CV SPLIT II 147X106" 9158

## (undated) DEVICE — DRAPE MAYO STAND 23X54 8337

## (undated) DEVICE — BLADE KNIFE SURG 10 371110

## (undated) DEVICE — BLADE KNIFE SURG 15 371115

## (undated) DEVICE — SUCTION CANISTER MEDIVAC LINER 3000ML W/LID 65651-530

## (undated) DEVICE — Device

## (undated) DEVICE — CLIP HORIZON MED BLUE 002200

## (undated) DEVICE — STPL RELOAD 60 X 3.8MM GIA6038L

## (undated) DEVICE — PACK MAJOR SBA15MAFSI

## (undated) DEVICE — SU VICRYL 0 CT-1 27" UND J260H

## (undated) DEVICE — BARRIER SEPRAFILM 5X6" SINGLE SHEET 4301-02

## (undated) DEVICE — STPL RELOAD 80 X 3.8MM GIA8038L

## (undated) DEVICE — SPONGE RAY-TEC 4X4" 7317

## (undated) DEVICE — DRAIN JACKSON PRATT RESERVOIR 100ML SU130-1305

## (undated) DEVICE — STPL RELOAD LINEAR 60 X 3.5MM TA6035L

## (undated) DEVICE — SU VICRYL 2-0 SH 27" J317H

## (undated) DEVICE — STPL PURSTRING  020242

## (undated) DEVICE — SU SILK 0 FSL 18" 678H

## (undated) DEVICE — CATH TRAY FOLEY SURESTEP 16FR WDRAIN BAG STLK LATEX A300316A

## (undated) DEVICE — STPL 60 X 3.8MM GIA6038S

## (undated) DEVICE — LINEN TOWEL PACK X5 5464

## (undated) DEVICE — GLOVE PROTEXIS W/NEU-THERA 8.0  2D73TE80

## (undated) DEVICE — SOL WATER IRRIG 1000ML BOTTLE 2F7114

## (undated) DEVICE — KIT ABDOMINAL HYST SMA15AHFSM

## (undated) DEVICE — SOL NACL 0.9% IRRIG 1000ML BOTTLE 07138-09

## (undated) DEVICE — PREP SKIN SCRUB TRAY 4461A

## (undated) DEVICE — STPL SKIN 35W APPOSE 8886803712

## (undated) DEVICE — SU VICRYL 0 TIE 12X18" J906G

## (undated) DEVICE — GLOVE PROTEXIS BLUE W/NEU-THERA 7.0  2D73EB70

## (undated) DEVICE — ESU LIGASURE IMPACT OPEN SEALER/DVDR CVD LG JAW LF4418

## (undated) DEVICE — SU VICRYL 0 CT-2 27" J334H

## (undated) DEVICE — SPONGE LAP 18X18" X8435

## (undated) DEVICE — STPL CIRCULAR 28MM W/TILT TIP 111987

## (undated) DEVICE — SU ETHIBOND 1 CT-1 30" X425H

## (undated) DEVICE — WIPES FOLEY CARE SURESTEP PROVON DFC100

## (undated) DEVICE — SPONGE KITTNER 31001010

## (undated) DEVICE — STPL 80 X 3.8MM GIA8038S

## (undated) DEVICE — ESU ELEC BLADE 6" COATED E1450-6

## (undated) DEVICE — DRAIN JACKSON PRATT 15FR ROUND SIL LF JP-2229

## (undated) DEVICE — VESSEL LOOPS RED MAXI

## (undated) DEVICE — DRAPE LEGGINGS 8421

## (undated) RX ORDER — CEFAZOLIN SODIUM 2 G/100ML
INJECTION, SOLUTION INTRAVENOUS
Status: DISPENSED
Start: 2018-04-26

## (undated) RX ORDER — HYDROMORPHONE HYDROCHLORIDE 1 MG/ML
INJECTION, SOLUTION INTRAMUSCULAR; INTRAVENOUS; SUBCUTANEOUS
Status: DISPENSED
Start: 2018-04-26

## (undated) RX ORDER — ONDANSETRON 2 MG/ML
INJECTION INTRAMUSCULAR; INTRAVENOUS
Status: DISPENSED
Start: 2018-04-26

## (undated) RX ORDER — FENTANYL CITRATE 50 UG/ML
INJECTION, SOLUTION INTRAMUSCULAR; INTRAVENOUS
Status: DISPENSED
Start: 2018-04-26

## (undated) RX ORDER — HEPARIN SODIUM (PORCINE) LOCK FLUSH IV SOLN 100 UNIT/ML 100 UNIT/ML
SOLUTION INTRAVENOUS
Status: DISPENSED
Start: 2022-06-23

## (undated) RX ORDER — LIDOCAINE HYDROCHLORIDE 20 MG/ML
INJECTION, SOLUTION EPIDURAL; INFILTRATION; INTRACAUDAL; PERINEURAL
Status: DISPENSED
Start: 2018-04-26

## (undated) RX ORDER — DEXAMETHASONE SODIUM PHOSPHATE 4 MG/ML
INJECTION, SOLUTION INTRA-ARTICULAR; INTRALESIONAL; INTRAMUSCULAR; INTRAVENOUS; SOFT TISSUE
Status: DISPENSED
Start: 2018-04-26

## (undated) RX ORDER — PROPOFOL 10 MG/ML
INJECTION, EMULSION INTRAVENOUS
Status: DISPENSED
Start: 2018-04-26

## (undated) RX ORDER — CEFAZOLIN SODIUM 1 G/3ML
INJECTION, POWDER, FOR SOLUTION INTRAMUSCULAR; INTRAVENOUS
Status: DISPENSED
Start: 2018-04-26

## (undated) RX ORDER — VECURONIUM BROMIDE 1 MG/ML
INJECTION, POWDER, LYOPHILIZED, FOR SOLUTION INTRAVENOUS
Status: DISPENSED
Start: 2018-04-26